# Patient Record
Sex: FEMALE | Race: WHITE | NOT HISPANIC OR LATINO | Employment: PART TIME | URBAN - METROPOLITAN AREA
[De-identification: names, ages, dates, MRNs, and addresses within clinical notes are randomized per-mention and may not be internally consistent; named-entity substitution may affect disease eponyms.]

---

## 2017-02-04 ENCOUNTER — OFFICE VISIT - HEALTHEAST (OUTPATIENT)
Dept: FAMILY MEDICINE | Facility: CLINIC | Age: 62
End: 2017-02-04

## 2017-02-04 DIAGNOSIS — S39.012A LOW BACK STRAIN: ICD-10-CM

## 2017-02-04 DIAGNOSIS — S30.1XXA CONTUSION OF FLANK: ICD-10-CM

## 2017-02-04 DIAGNOSIS — R10.9 FLANK PAIN: ICD-10-CM

## 2017-02-06 ENCOUNTER — AMBULATORY - HEALTHEAST (OUTPATIENT)
Dept: FAMILY MEDICINE | Facility: CLINIC | Age: 62
End: 2017-02-06

## 2017-02-06 DIAGNOSIS — N39.0 UTI (URINARY TRACT INFECTION): ICD-10-CM

## 2017-02-23 ENCOUNTER — OFFICE VISIT - HEALTHEAST (OUTPATIENT)
Dept: INTERNAL MEDICINE | Facility: CLINIC | Age: 62
End: 2017-02-23

## 2017-02-23 ENCOUNTER — COMMUNICATION - HEALTHEAST (OUTPATIENT)
Dept: INTERNAL MEDICINE | Facility: CLINIC | Age: 62
End: 2017-02-23

## 2017-02-23 DIAGNOSIS — R80.9 MICROALBUMINURIA: ICD-10-CM

## 2017-02-23 DIAGNOSIS — E11.9 DM2 (DIABETES MELLITUS, TYPE 2) (H): ICD-10-CM

## 2017-02-23 DIAGNOSIS — E78.5 HYPERLIPIDEMIA: ICD-10-CM

## 2017-02-23 DIAGNOSIS — Z87.440 HISTORY OF BLADDER INFECTIONS: ICD-10-CM

## 2017-02-23 LAB — HBA1C MFR BLD: 8 % (ref 3.5–6)

## 2017-02-25 ENCOUNTER — AMBULATORY - HEALTHEAST (OUTPATIENT)
Dept: INTERNAL MEDICINE | Facility: CLINIC | Age: 62
End: 2017-02-25

## 2017-02-25 DIAGNOSIS — E11.9 TYPE 2 DIABETES MELLITUS (H): ICD-10-CM

## 2017-03-07 ENCOUNTER — COMMUNICATION - HEALTHEAST (OUTPATIENT)
Dept: INTERNAL MEDICINE | Facility: CLINIC | Age: 62
End: 2017-03-07

## 2017-03-07 DIAGNOSIS — E11.9 DM2 (DIABETES MELLITUS, TYPE 2) (H): ICD-10-CM

## 2017-04-21 ENCOUNTER — RECORDS - HEALTHEAST (OUTPATIENT)
Dept: ADMINISTRATIVE | Facility: OTHER | Age: 62
End: 2017-04-21

## 2017-04-27 ENCOUNTER — OFFICE VISIT - HEALTHEAST (OUTPATIENT)
Dept: FAMILY MEDICINE | Facility: CLINIC | Age: 62
End: 2017-04-27

## 2017-04-27 DIAGNOSIS — H66.90 ACUTE OTITIS MEDIA: ICD-10-CM

## 2017-04-27 DIAGNOSIS — J01.90 ACUTE SINUSITIS: ICD-10-CM

## 2017-06-07 ENCOUNTER — OFFICE VISIT - HEALTHEAST (OUTPATIENT)
Dept: PODIATRY | Facility: CLINIC | Age: 62
End: 2017-06-07

## 2017-06-07 DIAGNOSIS — B07.0 VERRUCA PLANTARIS: ICD-10-CM

## 2017-07-20 ENCOUNTER — RECORDS - HEALTHEAST (OUTPATIENT)
Dept: ADMINISTRATIVE | Facility: OTHER | Age: 62
End: 2017-07-20

## 2017-08-01 ENCOUNTER — OFFICE VISIT - HEALTHEAST (OUTPATIENT)
Dept: INTERNAL MEDICINE | Facility: CLINIC | Age: 62
End: 2017-08-01

## 2017-08-01 DIAGNOSIS — B00.1 RECURRENT COLD SORES: ICD-10-CM

## 2017-08-01 DIAGNOSIS — Z12.31 SCREENING MAMMOGRAM, ENCOUNTER FOR: ICD-10-CM

## 2017-08-01 DIAGNOSIS — R80.9 MICROALBUMINURIA: ICD-10-CM

## 2017-08-01 DIAGNOSIS — E78.5 HLD (HYPERLIPIDEMIA): ICD-10-CM

## 2017-08-01 DIAGNOSIS — Z00.00 NORMAL ROUTINE PHYSICAL EXAMINATION: ICD-10-CM

## 2017-08-01 DIAGNOSIS — E11.9 DM2 (DIABETES MELLITUS, TYPE 2) (H): ICD-10-CM

## 2017-08-01 LAB
CHOLEST SERPL-MCNC: 162 MG/DL
FASTING STATUS PATIENT QL REPORTED: YES
HBA1C MFR BLD: 9.2 % (ref 3.5–6)
HDLC SERPL-MCNC: 50 MG/DL
LDLC SERPL CALC-MCNC: 86 MG/DL
TRIGL SERPL-MCNC: 128 MG/DL

## 2017-08-01 ASSESSMENT — MIFFLIN-ST. JEOR: SCORE: 1581.42

## 2017-08-04 LAB
HPV INTERPRETATION - HISTORICAL: NORMAL
HPV INTERPRETER - HISTORICAL: NORMAL

## 2017-08-06 ENCOUNTER — COMMUNICATION - HEALTHEAST (OUTPATIENT)
Dept: INTERNAL MEDICINE | Facility: CLINIC | Age: 62
End: 2017-08-06

## 2017-08-06 DIAGNOSIS — E78.5 HYPERLIPIDEMIA: ICD-10-CM

## 2017-08-07 LAB
BKR LAB AP ABNORMAL BLEEDING: NO
BKR LAB AP BIRTH CONTROL/HORMONES: NORMAL
BKR LAB AP CERVICAL APPEARANCE: NORMAL
BKR LAB AP GYN ADEQUACY: NORMAL
BKR LAB AP GYN INTERPRETATION: NORMAL
BKR LAB AP HPV REFLEX: NORMAL
BKR LAB AP LMP: NORMAL
BKR LAB AP PATIENT STATUS: NORMAL
BKR LAB AP PREVIOUS ABNORMAL: NORMAL
BKR LAB AP PREVIOUS NORMAL: 2015
HIGH RISK?: NO
PATH REPORT.COMMENTS IMP SPEC: NORMAL
RESULT FLAG (HE HISTORICAL CONVERSION): NORMAL

## 2017-08-10 ENCOUNTER — HOSPITAL ENCOUNTER (OUTPATIENT)
Dept: MAMMOGRAPHY | Facility: HOSPITAL | Age: 62
Discharge: HOME | End: 2017-08-10
Attending: INTERNAL MEDICINE

## 2017-08-10 DIAGNOSIS — Z12.31 SCREENING MAMMOGRAM, ENCOUNTER FOR: ICD-10-CM

## 2017-08-17 ENCOUNTER — AMBULATORY - HEALTHEAST (OUTPATIENT)
Dept: EDUCATION SERVICES | Facility: CLINIC | Age: 62
End: 2017-08-17

## 2017-08-17 DIAGNOSIS — E11.9 TYPE 2 DIABETES MELLITUS WITHOUT COMPLICATION, WITHOUT LONG-TERM CURRENT USE OF INSULIN (H): ICD-10-CM

## 2017-09-04 ENCOUNTER — COMMUNICATION - HEALTHEAST (OUTPATIENT)
Dept: INTERNAL MEDICINE | Facility: CLINIC | Age: 62
End: 2017-09-04

## 2017-09-04 DIAGNOSIS — E11.9 TYPE 2 DIABETES MELLITUS WITHOUT COMPLICATION, WITHOUT LONG-TERM CURRENT USE OF INSULIN (H): ICD-10-CM

## 2017-09-24 ENCOUNTER — COMMUNICATION - HEALTHEAST (OUTPATIENT)
Dept: INTERNAL MEDICINE | Facility: CLINIC | Age: 62
End: 2017-09-24

## 2017-09-24 DIAGNOSIS — R80.9 MICROALBUMINURIA: ICD-10-CM

## 2017-09-27 ENCOUNTER — AMBULATORY - HEALTHEAST (OUTPATIENT)
Dept: EDUCATION SERVICES | Facility: CLINIC | Age: 62
End: 2017-09-27

## 2017-09-27 DIAGNOSIS — E11.9 TYPE 2 DIABETES MELLITUS WITHOUT COMPLICATION, WITHOUT LONG-TERM CURRENT USE OF INSULIN (H): ICD-10-CM

## 2017-09-28 ENCOUNTER — RECORDS - HEALTHEAST (OUTPATIENT)
Dept: ADMINISTRATIVE | Facility: OTHER | Age: 62
End: 2017-09-28

## 2017-10-10 ENCOUNTER — COMMUNICATION - HEALTHEAST (OUTPATIENT)
Dept: ADMINISTRATIVE | Facility: CLINIC | Age: 62
End: 2017-10-10

## 2017-11-01 ENCOUNTER — OFFICE VISIT - HEALTHEAST (OUTPATIENT)
Dept: INTERNAL MEDICINE | Facility: CLINIC | Age: 62
End: 2017-11-01

## 2017-11-01 ENCOUNTER — AMBULATORY - HEALTHEAST (OUTPATIENT)
Dept: EDUCATION SERVICES | Facility: CLINIC | Age: 62
End: 2017-11-01

## 2017-11-01 DIAGNOSIS — R74.8 ELEVATED LIVER ENZYMES: ICD-10-CM

## 2017-11-01 DIAGNOSIS — E11.9 TYPE 2 DIABETES MELLITUS WITHOUT COMPLICATION, WITHOUT LONG-TERM CURRENT USE OF INSULIN (H): ICD-10-CM

## 2017-11-01 DIAGNOSIS — E78.5 HYPERLIPIDEMIA: ICD-10-CM

## 2017-11-01 LAB — HBA1C MFR BLD: 7.1 % (ref 3.5–6)

## 2017-11-21 ENCOUNTER — COMMUNICATION - HEALTHEAST (OUTPATIENT)
Dept: ENDOCRINOLOGY | Facility: CLINIC | Age: 62
End: 2017-11-21

## 2017-11-21 DIAGNOSIS — E11.9 TYPE 2 DIABETES MELLITUS WITHOUT COMPLICATION, WITHOUT LONG-TERM CURRENT USE OF INSULIN (H): ICD-10-CM

## 2017-12-23 ENCOUNTER — COMMUNICATION - HEALTHEAST (OUTPATIENT)
Dept: ENDOCRINOLOGY | Facility: CLINIC | Age: 62
End: 2017-12-23

## 2017-12-23 DIAGNOSIS — E11.9 TYPE 2 DIABETES MELLITUS WITHOUT COMPLICATION, WITHOUT LONG-TERM CURRENT USE OF INSULIN (H): ICD-10-CM

## 2018-01-08 ENCOUNTER — OFFICE VISIT - HEALTHEAST (OUTPATIENT)
Dept: FAMILY MEDICINE | Facility: CLINIC | Age: 63
End: 2018-01-08

## 2018-01-08 DIAGNOSIS — R10.9 FLANK PAIN: ICD-10-CM

## 2018-01-08 DIAGNOSIS — N39.0 UTI (URINARY TRACT INFECTION): ICD-10-CM

## 2018-01-08 DIAGNOSIS — R30.0 DYSURIA: ICD-10-CM

## 2018-01-08 LAB
ALBUMIN UR-MCNC: NEGATIVE MG/DL
APPEARANCE UR: CLEAR
BILIRUB UR QL STRIP: NEGATIVE
COLOR UR AUTO: YELLOW
GLUCOSE UR STRIP-MCNC: ABNORMAL MG/DL
HGB UR QL STRIP: NEGATIVE
KETONES UR STRIP-MCNC: NEGATIVE MG/DL
LEUKOCYTE ESTERASE UR QL STRIP: NEGATIVE
NITRATE UR QL: NEGATIVE
PH UR STRIP: 5.5 [PH] (ref 5–8)
SP GR UR STRIP: 1.02 (ref 1–1.03)
UROBILINOGEN UR STRIP-ACNC: ABNORMAL

## 2018-01-09 ENCOUNTER — COMMUNICATION - HEALTHEAST (OUTPATIENT)
Dept: INTERNAL MEDICINE | Facility: CLINIC | Age: 63
End: 2018-01-09

## 2018-01-10 LAB — BACTERIA SPEC CULT: ABNORMAL

## 2018-04-11 ENCOUNTER — COMMUNICATION - HEALTHEAST (OUTPATIENT)
Dept: INTERNAL MEDICINE | Facility: CLINIC | Age: 63
End: 2018-04-11

## 2018-04-11 DIAGNOSIS — E11.9 TYPE 2 DIABETES MELLITUS WITHOUT COMPLICATION, WITHOUT LONG-TERM CURRENT USE OF INSULIN (H): ICD-10-CM

## 2018-04-22 ENCOUNTER — COMMUNICATION - HEALTHEAST (OUTPATIENT)
Dept: INTERNAL MEDICINE | Facility: CLINIC | Age: 63
End: 2018-04-22

## 2018-04-22 DIAGNOSIS — E11.9 TYPE 2 DIABETES MELLITUS WITHOUT COMPLICATION, WITHOUT LONG-TERM CURRENT USE OF INSULIN (H): ICD-10-CM

## 2018-05-03 ENCOUNTER — OFFICE VISIT - HEALTHEAST (OUTPATIENT)
Dept: INTERNAL MEDICINE | Facility: CLINIC | Age: 63
End: 2018-05-03

## 2018-05-03 ENCOUNTER — COMMUNICATION - HEALTHEAST (OUTPATIENT)
Dept: INTERNAL MEDICINE | Facility: CLINIC | Age: 63
End: 2018-05-03

## 2018-05-03 DIAGNOSIS — E11.9 TYPE 2 DIABETES MELLITUS WITHOUT COMPLICATION, WITHOUT LONG-TERM CURRENT USE OF INSULIN (H): ICD-10-CM

## 2018-05-03 DIAGNOSIS — E78.5 HLD (HYPERLIPIDEMIA): ICD-10-CM

## 2018-05-03 LAB
ALBUMIN SERPL-MCNC: 4 G/DL (ref 3.5–5)
ALP SERPL-CCNC: 116 U/L (ref 45–120)
ALT SERPL W P-5'-P-CCNC: 49 U/L (ref 0–45)
ANION GAP SERPL CALCULATED.3IONS-SCNC: 10 MMOL/L (ref 5–18)
AST SERPL W P-5'-P-CCNC: 37 U/L (ref 0–40)
BILIRUB SERPL-MCNC: 1.8 MG/DL (ref 0–1)
BUN SERPL-MCNC: 21 MG/DL (ref 8–22)
CALCIUM SERPL-MCNC: 10 MG/DL (ref 8.5–10.5)
CHLORIDE BLD-SCNC: 106 MMOL/L (ref 98–107)
CO2 SERPL-SCNC: 24 MMOL/L (ref 22–31)
CREAT SERPL-MCNC: 0.68 MG/DL (ref 0.6–1.1)
GFR SERPL CREATININE-BSD FRML MDRD: >60 ML/MIN/1.73M2
GLUCOSE BLD-MCNC: 85 MG/DL (ref 70–125)
HBA1C MFR BLD: 8 % (ref 3.5–6)
POTASSIUM BLD-SCNC: 4.3 MMOL/L (ref 3.5–5)
PROT SERPL-MCNC: 7.3 G/DL (ref 6–8)
SODIUM SERPL-SCNC: 140 MMOL/L (ref 136–145)

## 2018-05-17 ENCOUNTER — RECORDS - HEALTHEAST (OUTPATIENT)
Dept: ADMINISTRATIVE | Facility: OTHER | Age: 63
End: 2018-05-17

## 2018-05-17 ENCOUNTER — RECORDS - HEALTHEAST (OUTPATIENT)
Dept: GENERAL RADIOLOGY | Facility: CLINIC | Age: 63
End: 2018-05-17

## 2018-05-17 ENCOUNTER — OFFICE VISIT - HEALTHEAST (OUTPATIENT)
Dept: FAMILY MEDICINE | Facility: CLINIC | Age: 63
End: 2018-05-17

## 2018-05-17 DIAGNOSIS — S99.912A INJURY OF LEFT ANKLE, INITIAL ENCOUNTER: ICD-10-CM
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
## 2018-05-17 DIAGNOSIS — S99.912A UNSPECIFIED INJURY OF LEFT ANKLE, INITIAL ENCOUNTER: ICD-10-CM

## 2018-05-18 ENCOUNTER — RECORDS - HEALTHEAST (OUTPATIENT)
Dept: ADMINISTRATIVE | Facility: OTHER | Age: 63
End: 2018-05-18

## 2018-06-01 ENCOUNTER — RECORDS - HEALTHEAST (OUTPATIENT)
Dept: ADMINISTRATIVE | Facility: OTHER | Age: 63
End: 2018-06-01

## 2018-07-16 ENCOUNTER — RECORDS - HEALTHEAST (OUTPATIENT)
Dept: ADMINISTRATIVE | Facility: OTHER | Age: 63
End: 2018-07-16

## 2018-07-17 ENCOUNTER — RECORDS - HEALTHEAST (OUTPATIENT)
Dept: ADMINISTRATIVE | Facility: OTHER | Age: 63
End: 2018-07-17

## 2018-09-25 ENCOUNTER — COMMUNICATION - HEALTHEAST (OUTPATIENT)
Dept: INTERNAL MEDICINE | Facility: CLINIC | Age: 63
End: 2018-09-25

## 2018-09-25 DIAGNOSIS — R80.9 MICROALBUMINURIA: ICD-10-CM

## 2018-11-19 ENCOUNTER — COMMUNICATION - HEALTHEAST (OUTPATIENT)
Dept: INTERNAL MEDICINE | Facility: CLINIC | Age: 63
End: 2018-11-19

## 2018-11-19 DIAGNOSIS — E11.9 TYPE 2 DIABETES MELLITUS WITHOUT COMPLICATION, WITHOUT LONG-TERM CURRENT USE OF INSULIN (H): ICD-10-CM

## 2018-12-26 ENCOUNTER — COMMUNICATION - HEALTHEAST (OUTPATIENT)
Dept: INTERNAL MEDICINE | Facility: CLINIC | Age: 63
End: 2018-12-26

## 2018-12-31 ENCOUNTER — OFFICE VISIT - HEALTHEAST (OUTPATIENT)
Dept: INTERNAL MEDICINE | Facility: CLINIC | Age: 63
End: 2018-12-31

## 2018-12-31 DIAGNOSIS — R19.7 DIARRHEA, UNSPECIFIED TYPE: ICD-10-CM

## 2018-12-31 DIAGNOSIS — N83.202 CYST OF LEFT OVARY: ICD-10-CM

## 2019-01-02 ENCOUNTER — AMBULATORY - HEALTHEAST (OUTPATIENT)
Dept: LAB | Facility: CLINIC | Age: 64
End: 2019-01-02

## 2019-01-02 DIAGNOSIS — R19.7 DIARRHEA, UNSPECIFIED TYPE: ICD-10-CM

## 2019-01-02 LAB
C DIFF TOX B STL QL: NEGATIVE
RIBOTYPE 027/NAP1/BI: NORMAL

## 2019-01-08 ENCOUNTER — OFFICE VISIT - HEALTHEAST (OUTPATIENT)
Dept: INTERNAL MEDICINE | Facility: CLINIC | Age: 64
End: 2019-01-08

## 2019-01-08 DIAGNOSIS — R19.5 LOOSE STOOLS: ICD-10-CM

## 2019-01-08 DIAGNOSIS — E11.9 TYPE 2 DIABETES MELLITUS WITHOUT COMPLICATION, WITHOUT LONG-TERM CURRENT USE OF INSULIN (H): ICD-10-CM

## 2019-01-16 ENCOUNTER — RECORDS - HEALTHEAST (OUTPATIENT)
Dept: ADMINISTRATIVE | Facility: OTHER | Age: 64
End: 2019-01-16

## 2019-01-20 ENCOUNTER — RECORDS - HEALTHEAST (OUTPATIENT)
Dept: ADMINISTRATIVE | Facility: OTHER | Age: 64
End: 2019-01-20

## 2019-01-21 ENCOUNTER — OFFICE VISIT - HEALTHEAST (OUTPATIENT)
Dept: PHARMACY | Facility: CLINIC | Age: 64
End: 2019-01-21

## 2019-01-21 DIAGNOSIS — R80.9 MICROALBUMINURIA: ICD-10-CM

## 2019-01-21 DIAGNOSIS — E78.5 HYPERLIPIDEMIA, UNSPECIFIED HYPERLIPIDEMIA TYPE: ICD-10-CM

## 2019-01-21 DIAGNOSIS — E11.9 TYPE 2 DIABETES MELLITUS WITHOUT COMPLICATION, WITHOUT LONG-TERM CURRENT USE OF INSULIN (H): ICD-10-CM

## 2019-01-21 DIAGNOSIS — E78.5 HYPERLIPIDEMIA: ICD-10-CM

## 2019-01-30 ENCOUNTER — COMMUNICATION - HEALTHEAST (OUTPATIENT)
Dept: PHARMACY | Facility: CLINIC | Age: 64
End: 2019-01-30

## 2019-02-11 ENCOUNTER — COMMUNICATION - HEALTHEAST (OUTPATIENT)
Dept: INTERNAL MEDICINE | Facility: CLINIC | Age: 64
End: 2019-02-11

## 2019-02-11 DIAGNOSIS — E11.9 TYPE 2 DIABETES MELLITUS WITHOUT COMPLICATION, WITHOUT LONG-TERM CURRENT USE OF INSULIN (H): ICD-10-CM

## 2019-02-20 ENCOUNTER — RECORDS - HEALTHEAST (OUTPATIENT)
Dept: ADMINISTRATIVE | Facility: OTHER | Age: 64
End: 2019-02-20

## 2019-03-01 ENCOUNTER — OFFICE VISIT - HEALTHEAST (OUTPATIENT)
Dept: PHARMACY | Facility: CLINIC | Age: 64
End: 2019-03-01

## 2019-03-01 DIAGNOSIS — R80.9 MICROALBUMINURIA: ICD-10-CM

## 2019-03-01 DIAGNOSIS — E78.5 HYPERLIPIDEMIA, UNSPECIFIED HYPERLIPIDEMIA TYPE: ICD-10-CM

## 2019-03-01 DIAGNOSIS — E11.9 TYPE 2 DIABETES MELLITUS WITHOUT COMPLICATION, WITHOUT LONG-TERM CURRENT USE OF INSULIN (H): ICD-10-CM

## 2019-03-05 ENCOUNTER — COMMUNICATION - HEALTHEAST (OUTPATIENT)
Dept: INTERNAL MEDICINE | Facility: CLINIC | Age: 64
End: 2019-03-05

## 2019-03-05 DIAGNOSIS — E11.9 DM2 (DIABETES MELLITUS, TYPE 2) (H): ICD-10-CM

## 2019-03-15 ENCOUNTER — COMMUNICATION - HEALTHEAST (OUTPATIENT)
Dept: PHARMACY | Facility: CLINIC | Age: 64
End: 2019-03-15

## 2019-03-15 DIAGNOSIS — E11.9 TYPE 2 DIABETES MELLITUS WITHOUT COMPLICATION, WITHOUT LONG-TERM CURRENT USE OF INSULIN (H): ICD-10-CM

## 2019-04-01 ENCOUNTER — RECORDS - HEALTHEAST (OUTPATIENT)
Dept: ADMINISTRATIVE | Facility: OTHER | Age: 64
End: 2019-04-01

## 2019-04-29 ENCOUNTER — OFFICE VISIT - HEALTHEAST (OUTPATIENT)
Dept: PHARMACY | Facility: CLINIC | Age: 64
End: 2019-04-29

## 2019-04-29 ENCOUNTER — AMBULATORY - HEALTHEAST (OUTPATIENT)
Dept: LAB | Facility: CLINIC | Age: 64
End: 2019-04-29

## 2019-04-29 DIAGNOSIS — E11.9 TYPE 2 DIABETES MELLITUS WITHOUT COMPLICATION, WITHOUT LONG-TERM CURRENT USE OF INSULIN (H): ICD-10-CM

## 2019-04-29 DIAGNOSIS — E78.5 HYPERLIPIDEMIA, UNSPECIFIED HYPERLIPIDEMIA TYPE: ICD-10-CM

## 2019-04-29 DIAGNOSIS — R80.9 MICROALBUMINURIA: ICD-10-CM

## 2019-04-29 LAB
ALBUMIN SERPL-MCNC: 3.9 G/DL (ref 3.5–5)
ALP SERPL-CCNC: 97 U/L (ref 45–120)
ALT SERPL W P-5'-P-CCNC: 96 U/L (ref 0–45)
ANION GAP SERPL CALCULATED.3IONS-SCNC: 10 MMOL/L (ref 5–18)
AST SERPL W P-5'-P-CCNC: 67 U/L (ref 0–40)
BILIRUB SERPL-MCNC: 1 MG/DL (ref 0–1)
BUN SERPL-MCNC: 16 MG/DL (ref 8–22)
CALCIUM SERPL-MCNC: 9.9 MG/DL (ref 8.5–10.5)
CHLORIDE BLD-SCNC: 103 MMOL/L (ref 98–107)
CO2 SERPL-SCNC: 26 MMOL/L (ref 22–31)
CREAT SERPL-MCNC: 0.72 MG/DL (ref 0.6–1.1)
CREAT UR-MCNC: 136 MG/DL
GFR SERPL CREATININE-BSD FRML MDRD: >60 ML/MIN/1.73M2
GLUCOSE BLD-MCNC: 91 MG/DL (ref 70–125)
HBA1C MFR BLD: 9 % (ref 3.5–6)
LDLC SERPL CALC-MCNC: 109 MG/DL
MICROALBUMIN UR-MCNC: 13.69 MG/DL (ref 0–1.99)
MICROALBUMIN/CREAT UR: 100.7 MG/G
POTASSIUM BLD-SCNC: 4.1 MMOL/L (ref 3.5–5)
PROT SERPL-MCNC: 7.2 G/DL (ref 6–8)
SODIUM SERPL-SCNC: 139 MMOL/L (ref 136–145)

## 2019-05-27 ENCOUNTER — COMMUNICATION - HEALTHEAST (OUTPATIENT)
Dept: NURSING | Facility: CLINIC | Age: 64
End: 2019-05-27

## 2019-05-28 ENCOUNTER — RECORDS - HEALTHEAST (OUTPATIENT)
Dept: ADMINISTRATIVE | Facility: OTHER | Age: 64
End: 2019-05-28

## 2019-05-29 ENCOUNTER — RECORDS - HEALTHEAST (OUTPATIENT)
Dept: ADMINISTRATIVE | Facility: OTHER | Age: 64
End: 2019-05-29

## 2019-06-12 ENCOUNTER — OFFICE VISIT - HEALTHEAST (OUTPATIENT)
Dept: PHARMACY | Facility: CLINIC | Age: 64
End: 2019-06-12

## 2019-06-12 DIAGNOSIS — E11.9 TYPE 2 DIABETES MELLITUS WITHOUT COMPLICATION, WITHOUT LONG-TERM CURRENT USE OF INSULIN (H): ICD-10-CM

## 2019-06-12 DIAGNOSIS — E78.5 HYPERLIPIDEMIA, UNSPECIFIED HYPERLIPIDEMIA TYPE: ICD-10-CM

## 2019-06-12 DIAGNOSIS — R80.9 MICROALBUMINURIA: ICD-10-CM

## 2019-06-17 ENCOUNTER — COMMUNICATION - HEALTHEAST (OUTPATIENT)
Dept: FAMILY MEDICINE | Facility: CLINIC | Age: 64
End: 2019-06-17

## 2019-06-28 ENCOUNTER — COMMUNICATION - HEALTHEAST (OUTPATIENT)
Dept: NURSING | Facility: CLINIC | Age: 64
End: 2019-06-28

## 2019-07-24 ENCOUNTER — OFFICE VISIT - HEALTHEAST (OUTPATIENT)
Dept: PHARMACY | Facility: CLINIC | Age: 64
End: 2019-07-24

## 2019-07-24 DIAGNOSIS — E78.5 HYPERLIPIDEMIA, UNSPECIFIED HYPERLIPIDEMIA TYPE: ICD-10-CM

## 2019-07-24 DIAGNOSIS — E11.9 TYPE 2 DIABETES MELLITUS WITHOUT COMPLICATION, WITHOUT LONG-TERM CURRENT USE OF INSULIN (H): ICD-10-CM

## 2019-07-24 DIAGNOSIS — R80.9 MICROALBUMINURIA: ICD-10-CM

## 2019-07-26 ENCOUNTER — COMMUNICATION - HEALTHEAST (OUTPATIENT)
Dept: INTERNAL MEDICINE | Facility: CLINIC | Age: 64
End: 2019-07-26

## 2019-07-26 DIAGNOSIS — E11.9 DM2 (DIABETES MELLITUS, TYPE 2) (H): ICD-10-CM

## 2019-10-24 ENCOUNTER — COMMUNICATION - HEALTHEAST (OUTPATIENT)
Dept: INTERNAL MEDICINE | Facility: CLINIC | Age: 64
End: 2019-10-24

## 2019-10-24 ENCOUNTER — OFFICE VISIT - HEALTHEAST (OUTPATIENT)
Dept: INTERNAL MEDICINE | Facility: CLINIC | Age: 64
End: 2019-10-24

## 2019-10-24 DIAGNOSIS — Z51.81 ENCOUNTER FOR THERAPEUTIC DRUG MONITORING: ICD-10-CM

## 2019-10-24 DIAGNOSIS — Z87.19 HISTORY OF FATTY INFILTRATION OF LIVER: ICD-10-CM

## 2019-10-24 DIAGNOSIS — E11.21 MICROALBUMINURIC DIABETIC NEPHROPATHY (H): ICD-10-CM

## 2019-10-24 DIAGNOSIS — Z86.0100 HISTORY OF COLONIC POLYPS: ICD-10-CM

## 2019-10-24 DIAGNOSIS — Z82.49 FAMILY HISTORY OF CORONARY ARTERY DISEASE IN FATHER: ICD-10-CM

## 2019-10-24 DIAGNOSIS — E78.5 HYPERLIPIDEMIA: ICD-10-CM

## 2019-10-24 DIAGNOSIS — Z12.31 VISIT FOR SCREENING MAMMOGRAM: ICD-10-CM

## 2019-10-24 DIAGNOSIS — E11.9 TYPE 2 DIABETES MELLITUS WITHOUT COMPLICATION, WITHOUT LONG-TERM CURRENT USE OF INSULIN (H): ICD-10-CM

## 2019-10-24 DIAGNOSIS — E55.9 VITAMIN D DEFICIENCY: ICD-10-CM

## 2019-10-24 DIAGNOSIS — N83.201 RIGHT OVARIAN CYST: ICD-10-CM

## 2019-10-24 LAB
ALBUMIN SERPL-MCNC: 4.1 G/DL (ref 3.5–5)
ALP SERPL-CCNC: 97 U/L (ref 45–120)
ALT SERPL W P-5'-P-CCNC: 33 U/L (ref 0–45)
ANION GAP SERPL CALCULATED.3IONS-SCNC: 11 MMOL/L (ref 5–18)
AST SERPL W P-5'-P-CCNC: 27 U/L (ref 0–40)
BILIRUB SERPL-MCNC: 2.2 MG/DL (ref 0–1)
BUN SERPL-MCNC: 23 MG/DL (ref 8–22)
CALCIUM SERPL-MCNC: 9.7 MG/DL (ref 8.5–10.5)
CHLORIDE BLD-SCNC: 102 MMOL/L (ref 98–107)
CO2 SERPL-SCNC: 23 MMOL/L (ref 22–31)
CREAT SERPL-MCNC: 0.77 MG/DL (ref 0.6–1.1)
GFR SERPL CREATININE-BSD FRML MDRD: >60 ML/MIN/1.73M2
GLUCOSE BLD-MCNC: 110 MG/DL (ref 70–125)
HBA1C MFR BLD: 7.2 % (ref 3.5–6)
POTASSIUM BLD-SCNC: 4.3 MMOL/L (ref 3.5–5)
PROT SERPL-MCNC: 6.8 G/DL (ref 6–8)
SODIUM SERPL-SCNC: 136 MMOL/L (ref 136–145)

## 2019-10-25 ENCOUNTER — COMMUNICATION - HEALTHEAST (OUTPATIENT)
Dept: INTERNAL MEDICINE | Facility: CLINIC | Age: 64
End: 2019-10-25

## 2019-10-28 ENCOUNTER — COMMUNICATION - HEALTHEAST (OUTPATIENT)
Dept: NURSING | Facility: CLINIC | Age: 64
End: 2019-10-28

## 2019-12-12 ENCOUNTER — HOSPITAL ENCOUNTER (OUTPATIENT)
Dept: MAMMOGRAPHY | Facility: CLINIC | Age: 64
Discharge: HOME | End: 2019-12-12
Attending: INTERNAL MEDICINE

## 2019-12-12 DIAGNOSIS — Z12.31 VISIT FOR SCREENING MAMMOGRAM: ICD-10-CM

## 2019-12-18 ENCOUNTER — RECORDS - HEALTHEAST (OUTPATIENT)
Dept: ADMINISTRATIVE | Facility: OTHER | Age: 64
End: 2019-12-18

## 2019-12-31 ENCOUNTER — COMMUNICATION - HEALTHEAST (OUTPATIENT)
Dept: INTERNAL MEDICINE | Facility: CLINIC | Age: 64
End: 2019-12-31

## 2020-01-20 ENCOUNTER — OFFICE VISIT - HEALTHEAST (OUTPATIENT)
Dept: INTERNAL MEDICINE | Facility: CLINIC | Age: 65
End: 2020-01-20

## 2020-01-20 DIAGNOSIS — E11.9 TYPE 2 DIABETES MELLITUS WITHOUT COMPLICATION, WITHOUT LONG-TERM CURRENT USE OF INSULIN (H): ICD-10-CM

## 2020-01-20 DIAGNOSIS — Z87.19 HISTORY OF FATTY INFILTRATION OF LIVER: ICD-10-CM

## 2020-01-20 DIAGNOSIS — Z86.0100 HISTORY OF COLONIC POLYPS: ICD-10-CM

## 2020-01-20 DIAGNOSIS — R80.9 MICROALBUMINURIA: ICD-10-CM

## 2020-01-20 DIAGNOSIS — E78.00 HYPERCHOLESTEROLEMIA: ICD-10-CM

## 2020-01-20 DIAGNOSIS — Z51.81 ENCOUNTER FOR THERAPEUTIC DRUG MONITORING: ICD-10-CM

## 2020-01-20 DIAGNOSIS — N83.202 CYST OF LEFT OVARY: ICD-10-CM

## 2020-01-20 LAB
ALBUMIN SERPL-MCNC: 3.7 G/DL (ref 3.5–5)
ALP SERPL-CCNC: 113 U/L (ref 45–120)
ALT SERPL W P-5'-P-CCNC: 30 U/L (ref 0–45)
ANION GAP SERPL CALCULATED.3IONS-SCNC: 13 MMOL/L (ref 5–18)
AST SERPL W P-5'-P-CCNC: 23 U/L (ref 0–40)
BILIRUB SERPL-MCNC: 1 MG/DL (ref 0–1)
BUN SERPL-MCNC: 14 MG/DL (ref 8–22)
CALCIUM SERPL-MCNC: 9.2 MG/DL (ref 8.5–10.5)
CHLORIDE BLD-SCNC: 105 MMOL/L (ref 98–107)
CHOLEST SERPL-MCNC: 178 MG/DL
CO2 SERPL-SCNC: 22 MMOL/L (ref 22–31)
CREAT SERPL-MCNC: 0.65 MG/DL (ref 0.6–1.1)
FASTING STATUS PATIENT QL REPORTED: YES
GFR SERPL CREATININE-BSD FRML MDRD: >60 ML/MIN/1.73M2
GLUCOSE BLD-MCNC: 132 MG/DL (ref 70–125)
HBA1C MFR BLD: 6.8 % (ref 3.5–6)
HDLC SERPL-MCNC: 52 MG/DL
LDLC SERPL CALC-MCNC: 95 MG/DL
POTASSIUM BLD-SCNC: 4.4 MMOL/L (ref 3.5–5)
PROT SERPL-MCNC: 6.7 G/DL (ref 6–8)
SODIUM SERPL-SCNC: 140 MMOL/L (ref 136–145)
TRIGL SERPL-MCNC: 156 MG/DL

## 2020-01-21 ENCOUNTER — COMMUNICATION - HEALTHEAST (OUTPATIENT)
Dept: INTERNAL MEDICINE | Facility: CLINIC | Age: 65
End: 2020-01-21

## 2020-02-24 ENCOUNTER — COMMUNICATION - HEALTHEAST (OUTPATIENT)
Dept: NURSING | Facility: CLINIC | Age: 65
End: 2020-02-24

## 2020-04-16 ENCOUNTER — COMMUNICATION - HEALTHEAST (OUTPATIENT)
Dept: INTERNAL MEDICINE | Facility: CLINIC | Age: 65
End: 2020-04-16

## 2020-04-16 DIAGNOSIS — E11.9 TYPE 2 DIABETES MELLITUS WITHOUT COMPLICATION, WITHOUT LONG-TERM CURRENT USE OF INSULIN (H): ICD-10-CM

## 2020-06-01 ENCOUNTER — COMMUNICATION - HEALTHEAST (OUTPATIENT)
Dept: INTERNAL MEDICINE | Facility: CLINIC | Age: 65
End: 2020-06-01

## 2020-06-30 ENCOUNTER — AMBULATORY - HEALTHEAST (OUTPATIENT)
Dept: FAMILY MEDICINE | Facility: CLINIC | Age: 65
End: 2020-06-30

## 2020-06-30 ENCOUNTER — VIRTUAL VISIT (OUTPATIENT)
Dept: FAMILY MEDICINE | Facility: OTHER | Age: 65
End: 2020-06-30

## 2020-06-30 ENCOUNTER — NURSE TRIAGE (OUTPATIENT)
Dept: NURSING | Facility: CLINIC | Age: 65
End: 2020-06-30

## 2020-06-30 DIAGNOSIS — Z20.822 SUSPECTED COVID-19 VIRUS INFECTION: ICD-10-CM

## 2020-06-30 NOTE — TELEPHONE ENCOUNTER
Patient calling. 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    Her son-in-law was tested on Friday and found out late yesterday that he is covid positive.    She is requesting to be tested.    Referred on [facility] for test orders.    Protocol and care advice reviewed  Caller states understanding of the recommended disposition    Advised to call back if further questions or concerns      Additional Information    Negative: [1] Caller is not with the adult (patient) AND [2] reporting urgent symptoms    Negative: Lab result questions    Negative: Medication questions    Negative: Caller can't be reached by phone    Negative: Caller has already spoken to PCP or another triager    Negative: RN needs further essential information from caller in order to complete triage    Negative: Requesting regular office appointment    Negative: [1] Caller requesting NON-URGENT health information AND [2] PCP's office is the best resource    General information question, no triage required and triager able to answer question    Negative: Health Information question, no triage required and triager able to answer question    Protocols used: INFORMATION ONLY CALL-A-

## 2020-06-30 NOTE — PROGRESS NOTES
"Date: 2020 13:28:47  Clinician: Jc Talbot  Clinician NPI: [id number]  Patient: Helen Chavez  Patient : 1955  Patient Address: [address] [place] [id number]  Patient Phone: [phone]  Visit Protocol: URI  Patient Summary:  Helen is a 65 year old ( : 1955 ) female who initiated a Visit for COVID-19 (Coronavirus) evaluation and screening. When asked the question \"Please sign me up to receive news, health information and promotions from [facility].\", Helen responded \"No\".    When asked when her symptoms started, Helen reported that she does not have any symptoms.   She denies having recent facial or sinus surgery in the past 60 days and taking antibiotic medication in the past month.    Pertinent COVID-19 (Coronavirus) information  In the past 14 days, Helen has not worked in a congregate living setting.   She does not work or volunteer as healthcare worker or a  and does not work or volunteer in a healthcare facility.   Helen also has not lived in a congregate living setting in the past 14 days. She does not live with a healthcare worker.   Helen has had a close contact with a laboratory-confirmed COVID-19 patient in the last 14 days. Additional information about contact with COVID-19 (Coronavirus) patient as reported by the patient (free text): My son-n-law tested positive yesterday, . I was at his home last week 3 times &amp; was with him on Father's Day for at least 6 hours. My  has had cancer &amp; his white platelet level is 24, which is extremely low.   Pertinent medical history  Helen typically gets a yeast infection when she takes antibiotics. She is not sure if she has used fluconazole (Diflucan) to treat previous yeast infections.   Helen does not need a return to work/school note.   Weight: 191 lbs   Helen does not smoke or use smokeless tobacco.   Additional information as reported by the patient (free text): I " have Diabetes.   Weight: 191 lbs  Reason for repeat visit for the same protocol within 24 hours:  The [facility] representative told me to create a new visit when I went to the page that said list medications, it froze &amp; I was unable to go any further.  I want a COVID-19 test. I was exposed to a relative that tested positive on Monday, June 29th. I am 65 years old &amp; I am a diabetic.  See the History of referred by protocol and completed visits section for additional details on the previous visit.    MEDICATIONS: Ozempic subcutaneous, lisinopril oral, rosuvastatin oral, metformin oral, ALLERGIES: Penicillins  Clinician Response:  Dear Helen,   Based on your exposure to COVID-19 (Coronavirus), we would like to test you for this virus.  1. Please call [phone] to schedule your visit. Explain that [facility]. Be ready to share your [facility] visit ID number.  The following will serve as your written order for this COVID Test, ordered by me, for the indication of suspected COVID [Z20.828]: The test will be ordered in [facility], our electronic health record, after you are scheduled. It will show as ordered and authorized by Rich Hollins MD.  Order: COVID-19 (Coronavirus) PCR for ASYMPTOMATIC EXPOSURE testing from [facility].      2. When it's time for your COVID test:  Stay at least 6 feet away from others. (If someone will drive you to your test, stay in the backseat, as far away from the  as you can.)   Cover your mouth and nose with a mask, tissue or washcloth.  Go straight to the testing site. Don't make any stops on the way there or back.  Please note  Caregivers in these groups are at risk for severe illness due to COVID-19:  o People 65 years and older  o People who live in a nursing home or long-term care facility  o People with chronic disease (lung, heart, cancer, diabetes, kidney, liver, immunologic)  o People who have a weakened immune system, including those who:     Are in cancer treatment   Take medicine that weakens the immune system, such as corticosteroids   Had a bone marrow or organ transplant   Have an immune deficiency   Have poorly controlled HIV or AIDS   Are obese (body mass index of 40 or higher)   Smoke regularly   Where can I get more information?     [facility] -- About COVID-19: [email or web address]   CDC -- What to Do If You're Sick: www.cdc.gov/coronavirus/2019-ncov/about/steps-when-sick.html   CDC -- Ending Home Isolation: www.cdc.gov/coronavirus/2019-ncov/hcp/disposition-in-home-patients.html   CDC -- Caring for Someone: www.cdc.gov/coronavirus/2019-ncov/if-you-are-sick/care-for-someone.html   [facility] -- Interim Guidance for Hospital Discharge to Home: www.[facility].mn.us/diseases/coronavirus/hcp/hospdischarge.pdf   [facility] clinical trials (COVID-19 research studies): clinicalaffairs.[employer].[facility]/[employer]-clinical-trials   Below are the COVID-19 hotlines at the [place] [facility] ([facility]). Interpreters are available.     For health questions: Call 778-589-4054 or 1-867.179.9518 (7 a.m. to 7 p.m.)  For questions about schools and childcare: Call 810-476-8806 or 1-814.745.4029 (7 a.m. to 7 p.m.)   Diagnosis: Acute upper respiratory infection, unspecified  Diagnosis ICD: J06.9

## 2020-07-01 ENCOUNTER — AMBULATORY - HEALTHEAST (OUTPATIENT)
Dept: FAMILY MEDICINE | Facility: CLINIC | Age: 65
End: 2020-07-01

## 2020-07-01 DIAGNOSIS — Z20.822 SUSPECTED COVID-19 VIRUS INFECTION: ICD-10-CM

## 2020-07-03 ENCOUNTER — COMMUNICATION - HEALTHEAST (OUTPATIENT)
Dept: FAMILY MEDICINE | Facility: CLINIC | Age: 65
End: 2020-07-03

## 2020-07-13 ENCOUNTER — COMMUNICATION - HEALTHEAST (OUTPATIENT)
Dept: INTERNAL MEDICINE | Facility: CLINIC | Age: 65
End: 2020-07-13

## 2020-07-13 DIAGNOSIS — E11.9 TYPE 2 DIABETES MELLITUS WITHOUT COMPLICATION, WITHOUT LONG-TERM CURRENT USE OF INSULIN (H): ICD-10-CM

## 2020-07-27 ENCOUNTER — COMMUNICATION - HEALTHEAST (OUTPATIENT)
Dept: INTERNAL MEDICINE | Facility: CLINIC | Age: 65
End: 2020-07-27

## 2020-07-27 DIAGNOSIS — R80.9 MICROALBUMINURIA: ICD-10-CM

## 2020-07-28 ENCOUNTER — COMMUNICATION - HEALTHEAST (OUTPATIENT)
Dept: INTERNAL MEDICINE | Facility: CLINIC | Age: 65
End: 2020-07-28

## 2020-07-28 DIAGNOSIS — E11.9 TYPE 2 DIABETES MELLITUS WITHOUT COMPLICATION, WITHOUT LONG-TERM CURRENT USE OF INSULIN (H): ICD-10-CM

## 2020-11-01 ENCOUNTER — COMMUNICATION - HEALTHEAST (OUTPATIENT)
Dept: INTERNAL MEDICINE | Facility: CLINIC | Age: 65
End: 2020-11-01

## 2020-11-01 DIAGNOSIS — E78.5 HYPERLIPIDEMIA: ICD-10-CM

## 2020-11-05 ENCOUNTER — COMMUNICATION - HEALTHEAST (OUTPATIENT)
Dept: INTERNAL MEDICINE | Facility: CLINIC | Age: 65
End: 2020-11-05

## 2020-11-05 DIAGNOSIS — E11.9 TYPE 2 DIABETES MELLITUS WITHOUT COMPLICATION, WITHOUT LONG-TERM CURRENT USE OF INSULIN (H): ICD-10-CM

## 2020-11-07 ENCOUNTER — COMMUNICATION - HEALTHEAST (OUTPATIENT)
Dept: INTERNAL MEDICINE | Facility: CLINIC | Age: 65
End: 2020-11-07

## 2020-11-07 DIAGNOSIS — E11.9 DM2 (DIABETES MELLITUS, TYPE 2) (H): ICD-10-CM

## 2020-11-09 RX ORDER — BLOOD SUGAR DIAGNOSTIC
STRIP MISCELLANEOUS
Qty: 100 STRIP | Refills: 5 | Status: SHIPPED | OUTPATIENT
Start: 2020-11-09 | End: 2021-07-07

## 2020-11-17 ENCOUNTER — RECORDS - HEALTHEAST (OUTPATIENT)
Dept: ADMINISTRATIVE | Facility: OTHER | Age: 65
End: 2020-11-17

## 2020-11-17 ENCOUNTER — COMMUNICATION - HEALTHEAST (OUTPATIENT)
Dept: SCHEDULING | Facility: CLINIC | Age: 65
End: 2020-11-17

## 2021-01-31 ENCOUNTER — COMMUNICATION - HEALTHEAST (OUTPATIENT)
Dept: INTERNAL MEDICINE | Facility: CLINIC | Age: 66
End: 2021-01-31

## 2021-01-31 DIAGNOSIS — R80.9 MICROALBUMINURIA: ICD-10-CM

## 2021-01-31 DIAGNOSIS — E78.5 HYPERLIPIDEMIA: ICD-10-CM

## 2021-02-16 ENCOUNTER — RECORDS - HEALTHEAST (OUTPATIENT)
Dept: ADMINISTRATIVE | Facility: OTHER | Age: 66
End: 2021-02-16

## 2021-02-16 LAB — RETINOPATHY: NEGATIVE

## 2021-02-22 ENCOUNTER — RECORDS - HEALTHEAST (OUTPATIENT)
Dept: HEALTH INFORMATION MANAGEMENT | Facility: CLINIC | Age: 66
End: 2021-02-22

## 2021-05-03 ENCOUNTER — COMMUNICATION - HEALTHEAST (OUTPATIENT)
Dept: INTERNAL MEDICINE | Facility: CLINIC | Age: 66
End: 2021-05-03

## 2021-05-03 ENCOUNTER — OFFICE VISIT - HEALTHEAST (OUTPATIENT)
Dept: INTERNAL MEDICINE | Facility: CLINIC | Age: 66
End: 2021-05-03

## 2021-05-03 DIAGNOSIS — Z12.11 SCREEN FOR COLON CANCER: ICD-10-CM

## 2021-05-03 DIAGNOSIS — E78.5 HYPERLIPIDEMIA, UNSPECIFIED HYPERLIPIDEMIA TYPE: ICD-10-CM

## 2021-05-03 DIAGNOSIS — E78.5 HYPERLIPIDEMIA: ICD-10-CM

## 2021-05-03 DIAGNOSIS — E11.9 TYPE 2 DIABETES MELLITUS WITHOUT COMPLICATION, WITHOUT LONG-TERM CURRENT USE OF INSULIN (H): ICD-10-CM

## 2021-05-03 DIAGNOSIS — Z12.11 SCREENING FOR COLON CANCER: ICD-10-CM

## 2021-05-03 DIAGNOSIS — R80.9 MICROALBUMINURIA: ICD-10-CM

## 2021-05-03 LAB
ALBUMIN SERPL-MCNC: 4.3 G/DL (ref 3.5–5)
ALP SERPL-CCNC: 88 U/L (ref 45–120)
ALT SERPL W P-5'-P-CCNC: 34 U/L (ref 0–45)
ANION GAP SERPL CALCULATED.3IONS-SCNC: 11 MMOL/L (ref 5–18)
AST SERPL W P-5'-P-CCNC: 24 U/L (ref 0–40)
BILIRUB SERPL-MCNC: 1.3 MG/DL (ref 0–1)
BUN SERPL-MCNC: 15 MG/DL (ref 8–22)
CALCIUM SERPL-MCNC: 9.6 MG/DL (ref 8.5–10.5)
CHLORIDE BLD-SCNC: 104 MMOL/L (ref 98–107)
CHOLEST SERPL-MCNC: 139 MG/DL
CO2 SERPL-SCNC: 25 MMOL/L (ref 22–31)
CREAT SERPL-MCNC: 0.64 MG/DL (ref 0.6–1.1)
CREAT UR-MCNC: 120.7 MG/DL
FASTING STATUS PATIENT QL REPORTED: YES
GFR SERPL CREATININE-BSD FRML MDRD: >60 ML/MIN/1.73M2
GLUCOSE BLD-MCNC: 111 MG/DL (ref 70–125)
HBA1C MFR BLD: 7.5 %
HDLC SERPL-MCNC: 47 MG/DL
LDLC SERPL CALC-MCNC: 58 MG/DL
MICROALBUMIN UR-MCNC: 9.19 MG/DL (ref 0–1.99)
MICROALBUMIN/CREAT UR: 76.1 MG/G
POTASSIUM BLD-SCNC: 4.2 MMOL/L (ref 3.5–5)
PROT SERPL-MCNC: 7.2 G/DL (ref 6–8)
SODIUM SERPL-SCNC: 140 MMOL/L (ref 136–145)
TRIGL SERPL-MCNC: 172 MG/DL

## 2021-05-03 RX ORDER — SEMAGLUTIDE 1.34 MG/ML
1 INJECTION, SOLUTION SUBCUTANEOUS WEEKLY
Qty: 3 ML | Refills: 2 | Status: SHIPPED | OUTPATIENT
Start: 2021-05-03 | End: 2023-03-02

## 2021-05-03 RX ORDER — METFORMIN HCL 500 MG
500 TABLET, EXTENDED RELEASE 24 HR ORAL DAILY
Qty: 90 TABLET | Refills: 0 | Status: SHIPPED | OUTPATIENT
Start: 2021-05-03 | End: 2021-09-16

## 2021-05-03 RX ORDER — NYSTATIN AND TRIAMCINOLONE ACETONIDE 100000; 1 [USP'U]/G; MG/G
OINTMENT TOPICAL
Status: SHIPPED | COMMUNITY
Start: 2020-09-24 | End: 2021-12-17

## 2021-05-03 RX ORDER — ROSUVASTATIN CALCIUM 10 MG/1
10 TABLET, COATED ORAL AT BEDTIME
Qty: 90 TABLET | Refills: 0 | Status: SHIPPED | OUTPATIENT
Start: 2021-05-03 | End: 2021-08-17

## 2021-05-03 RX ORDER — LISINOPRIL 2.5 MG/1
2.5 TABLET ORAL DAILY
Qty: 90 TABLET | Refills: 0 | Status: SHIPPED | OUTPATIENT
Start: 2021-05-03 | End: 2021-08-03

## 2021-05-21 ENCOUNTER — RECORDS - HEALTHEAST (OUTPATIENT)
Dept: ADMINISTRATIVE | Facility: OTHER | Age: 66
End: 2021-05-21

## 2021-05-26 ENCOUNTER — RECORDS - HEALTHEAST (OUTPATIENT)
Dept: ADMINISTRATIVE | Facility: CLINIC | Age: 66
End: 2021-05-26

## 2021-05-27 ENCOUNTER — RECORDS - HEALTHEAST (OUTPATIENT)
Dept: ADMINISTRATIVE | Facility: CLINIC | Age: 66
End: 2021-05-27

## 2021-05-28 NOTE — PROGRESS NOTES
MTM Follow Up Encounter  Assessment & Plan                                                     1. Type 2 diabetes mellitus  Unfortunately A1c has increased to 9%, above goal of less than 7% per ADA guidelines.  She is currently on maximum tolerated dose of Ozempic, and glipizide.  There is room to increase dose of glipizide, but patient would prefer to be off this medication due to weight gain concerns.  Discussed other options, including SGLT2 inhibitor which patient is agreeable to start. Medication education and counseling was provided, including indication, expected effect, dosing instructions, and possible side effects. The patient's questions were answered.  Did discuss possibility of changing glucose monitor to freestyle noelel continuous glucose sensor system, which was generally well covered with [employer] insurance.  Patient declined but is interested in meeting with diabetes educator for a 2-week glucose sensor.  Plan   1. Check A1c.   2. Stop glipizide.   3. Start Jardiance 10 mg 1 tablet every morning.     2. Microalbuminuria  Blood pressure is well controlled and meeting goal of <140/90 mm Hg per JNC-8 hypertension guidelines. History of microalbuminuria. She experienced leg cramps with losartan. Cough with lisinopril, although she questions if this is truly related to the medication or having a cold at the time.  She would like to restart to see if cough is truly side effect from the medication.  Plan   1.  Restart lisinopril 2.5 mg once daily.    3. Hyperlipidemia  Appropriately on a moderate intensity statin given age and diabetes diagnosis per ACC/AHA guidelines. Last LDL of 86 mg/dl.       Follow Up  Return in about 6 weeks (around 6/10/2019).      Subjective & Objective                                                     Helen Chavez is a 64 y.o. female coming in for a follow up visit for Medication Therapy Management. She was referred to me from Clover Felix MD    Chief  Complaint: Follow up med changes; lab work  Medication Adherence/Access: Good; no issues identified.     1. Type 2 diabetes mellitus  Helen is taking Ozempic 1 mg SQ weekly, glipizide ER 5 or 10 mg daily, and metformin  mg daily. At a previous visit, we stopped glipizide and started Ozempic. Even with higher dose of Ozempic, BG were still not well controlled, so we restarted glipizide. She's not sure if she's taking 5 mg or 10 mg tablets. She is unable to tolerate a higher dose of metformin due to diarrhea. She has seen a decrease in her appetite. No lows. She feels her diet is very healthy, but she needs to do better about exercise. She's seen a nutritionist.   Before meals: 110-120   AM: 130-150     Lab Results   Component Value Date    HGBA1C 9.0 (H) 04/29/2019    HGBA1C 8.0 (H) 05/03/2018    HGBA1C 7.1 (H) 11/01/2017     Lab Results   Component Value Date    MICROALBUR 13.69 (H) 04/29/2019    LDLCALC 86 08/01/2017    CREATININE 0.72 04/29/2019       2. Microalbuminuria  Helen stopped taking losartan 25 mg, as she was having severe leg cramps. This resolved after a few days of stopping the medication. We changed from lisinopril due to cough, although she wonders if she just had a cold at the time. She takes this primarily for renal protection.     3. Hyperlipidemia  Helen is taking rosuvastatin 10 mg daily.  She states that she takes this faithfully every evening and understands it is for cholesterol.  No adverse effects noted. Previously, LDL has been as high as 204 mg/dl. She had her cholesterol checked at work and LDL was 95.     The 10-year ASCVD risk score ([place] KENDELL Jr., et al., 2013) is: 10.4%    Values used to calculate the score:      Age: 64 years      Sex: Female      Is Non- : No      Diabetic: Yes      Tobacco smoker: No      Systolic Blood Pressure: 132 mmHg      Is BP treated: No      HDL Cholesterol: 50 mg/dL      Total Cholesterol: 162 mg/dL      PMH: reviewed in  EPIC   Allergies/ADRs: reviewed in EPIC   Alcohol: reviewed in EPIC   Tobacco:   Social History     Tobacco Use   Smoking Status Never Smoker   Smokeless Tobacco Never Used     Today's Vitals:   BP Readings from Last 3 Encounters:   03/01/19 132/74   01/21/19 132/80   01/08/19 124/72     Pulse Readings from Last 3 Encounters:   03/01/19 76   01/21/19 70   01/08/19 66     Wt Readings from Last 3 Encounters:   03/01/19 (!) 224 lb 14.4 oz (102 kg)   01/21/19 (!) 227 lb (103 kg)   01/08/19 (!) 227 lb (103 kg)     ---------------    The patient was given a summary of these recommendations as an after visit summary    I spent 45 minutes with this patient today.   All changes were made via collaborative practice agreement with Hunter Wayne CNP. A copy of the visit note was provided to the patient's provider.     Velia Cuellar, PharmD, BCACP  Medication Management Pharmacist  [facility]        Current Outpatient Medications   Medication Sig Dispense Refill     aspirin 81 MG EC tablet Take 1 tablet (81 mg total) by mouth daily.  0     blood glucose test (ACCU-CHEK SMARTVIEW TEST STRIP) strips Use twice a day or as directed 100 strip 0     empagliflozin (JARDIANCE) 10 mg Tab Take 1 tablet (10 mg total) by mouth daily. 30 tablet 3     lancets 25 gauge Misc Use 1 each As Directed 2 (two) times a day.       lisinopril (ZESTRIL) 2.5 MG tablet Take 1 tablet (2.5 mg total) by mouth daily. 30 tablet 3     metFORMIN (GLUCOPHAGE-XR) 500 MG 24 hr tablet Take 1 tablet (500 mg total) by mouth daily. 90 tablet 3     rosuvastatin (CRESTOR) 10 MG tablet Take 1 tablet (10 mg total) by mouth at bedtime 90 tablet 3     semaglutide (OZEMPIC) 1 mg/0.75 mL (2 mg/1.5 mL) PnIj Inject 1 mg under the skin every 7 days. 3 mL 3     UNABLE TO FIND Med Name: Lumeg A-Z vitamin for eye       No current facility-administered medications for this visit.

## 2021-05-28 NOTE — PATIENT INSTRUCTIONS - HE
Recommendations from today's Medication Management (MTM) visit:                                                      1. Make an appointment to establish care with another doctor. Here the options for internists are Dr. Aryan Felix and Dr. Olvin Nguyen. Otherwise, Dr. Nader Pyle at [facility] is a great family medicine doctor. If you prefer a female, Dr. Olivia Mauro is also wonderful and she's a family medicine doctor at [facility] as well.     2. Stop glipizide.     3. Start Jardiance 10 mg 1 tablet every morning.     4. Restart lisinopril 2.5 mg once daily.     5. Call to schedule appointment with diabetes educator for 2 week glucose sensor.     Next MTM appointment:                                                        Wednesday, June 12th at 2 PM     To schedule another MTM appointment, please call the clinic directly or you may call   the MTM scheduling line at [phone] or toll-free at [phone].     My MTM pharmacist's contact information:                                                      It was a pleasure seeing you today. Thank you for your engagement in getting the most out of your medications! Please feel free to contact me with any questions or concerns you have.      Velia Cuellar, PharmD, BCACP  Medication Management (MTM) Pharmacist  [facility]  [phone]    You may receive a survey about the MTM services you received by email and/or US Mail.  I would appreciate your feedback to help me serve you better in the future. Your comments will be anonymous.

## 2021-05-29 ENCOUNTER — RECORDS - HEALTHEAST (OUTPATIENT)
Dept: ADMINISTRATIVE | Facility: CLINIC | Age: 66
End: 2021-05-29

## 2021-05-29 NOTE — TELEPHONE ENCOUNTER
Patient missed her scheduled dose of Ozempic last Friday, and she is concerned about when to take her next dose.  She would prefer her weekly injection to be on Monday or Wednesdays.  Advised her to give injection today and stick with schedule of every Monday.  She verbalized understanding.

## 2021-05-29 NOTE — PROGRESS NOTES
MTM Follow Up Encounter  Assessment & Plan                                                     1. Type 2 diabetes mellitus  Unfortunately A1c has increased to 9%, above goal of less than 7% per ADA guidelines.  She is currently on maximum dose of Ozempic, highest tolerated dose of metformin, and glipizide.  There is room to increase dose of glipizide, but patient would prefer to be off this medication due to weight gain concerns.  Unfortunately did not tolerate trial of SGLT-2 inhibitor. Could consider TZD, pioglitazone, but patient declines additional medication at this time. She is motivated to try a low carb diet and start exercising. We will follow up in 1 month to check on her progress.     2. Microalbuminuria  Blood pressure is well controlled and meeting goal of <140/90 mm Hg per JNC-8 hypertension guidelines. History of microalbuminuria. Now tolerating low dose lisinopril.    3. Hyperlipidemia  Appropriately on a moderate intensity statin given age and diabetes diagnosis per ACC/AHA guidelines. Last LDL of 86 mg/dl. LFTs tend to fluctuate and were elevated at last check. Have been high in the past prior to starting statin. Will recheck at follow-up. If high, will hold statin and refer to PCP for further evaluation if remain elevated.       Follow Up  Return in about 1 month (around 7/12/2019).      Subjective & Objective                                                     Helen Chavez is a 64 y.o. female coming in for a follow up visit for Medication Therapy Management. She was referred to me from Clover Felix MD. She will be establishing care with Dr. Nguyen in a few months.     Chief Complaint: Follow up med changes  Medication Adherence/Access: Good; no issues identified.     1. Type 2 diabetes mellitus  Helen is taking Ozempic 1 mg SQ weekly, glipizide ER 10 mg daily, and metformin  mg daily. She stopped Jardiance and restarted glipizide as Jardiance caused severe yeast infection  and patient refuses to retry. Treated yeast infection with Monistat and fluconazole.   At a previous visit, we stopped glipizide and started Ozempic. Even with higher dose of Ozempic, BG were still not well controlled, so we restarted glipizide. She is unable to tolerate a higher dose of metformin due to diarrhea. She recently saw her gynecologist who recommended a low carb, Paleo diet. BG this morning was 123.     Lab Results   Component Value Date    HGBA1C 9.0 (H) 04/29/2019    HGBA1C 8.0 (H) 05/03/2018    HGBA1C 7.1 (H) 11/01/2017     Lab Results   Component Value Date    MICROALBUR 13.69 (H) 04/29/2019    LDLCALC 86 08/01/2017    CREATININE 0.72 04/29/2019       2. Microalbuminuria  Helen is taking lisinopril 2.5 mg daily. No cough since restarting. She had stopped taking losartan 25 mg, as she was having severe leg cramps. This resolved after a few days of stopping the medication. She takes this primarily for renal protection.     3. Hyperlipidemia  Helen is taking rosuvastatin 10 mg daily.  She states that she takes this faithfully every evening and understands it is for cholesterol.  No adverse effects noted, although LFTs have fluctuated, even prior to statin.   Previously, LDL has been as high as 204 mg/dl. She had her cholesterol checked at work and LDL was 95.     The 10-year ASCVD risk score ([place] KENDELL Jr., et al., 2013) is: 10.1%    Values used to calculate the score:      Age: 64 years      Sex: Female      Is Non- : No      Diabetic: Yes      Tobacco smoker: No      Systolic Blood Pressure: 130 mmHg      Is BP treated: No      HDL Cholesterol: 50 mg/dL      Total Cholesterol: 162 mg/dL      PMH: reviewed in EPIC   Allergies/ADRs: reviewed in EPIC   Alcohol: reviewed in EPIC   Tobacco:   Social History     Tobacco Use   Smoking Status Never Smoker   Smokeless Tobacco Never Used     Today's Vitals:   BP Readings from Last 3 Encounters:   06/12/19 130/70   03/01/19 132/74    01/21/19 132/80     Pulse Readings from Last 3 Encounters:   06/12/19 72   03/01/19 76   01/21/19 70     Wt Readings from Last 3 Encounters:   06/12/19 (!) 224 lb (101.6 kg)   03/01/19 (!) 224 lb 14.4 oz (102 kg)   01/21/19 (!) 227 lb (103 kg)     ---------------    The patient was given a summary of these recommendations as an after visit summary    I spent 30 minutes with this patient today.   All changes were made via collaborative practice agreement with Hunter Wayne CNP. A copy of the visit note was provided to the patient's provider.     Velia Cuellar, PharmD, BCACP  Medication Management Pharmacist  [facility]        Current Outpatient Medications   Medication Sig Dispense Refill     glipiZIDE (GLUCOTROL XL) 10 MG 24 hr tablet Take 10 mg by mouth daily.       aspirin 81 MG EC tablet Take 1 tablet (81 mg total) by mouth daily.  0     blood glucose test (ACCU-CHEK SMARTVIEW TEST STRIP) strips Use twice a day or as directed 100 strip 0     lancets 25 gauge Misc Use 1 each As Directed 2 (two) times a day.       lisinopril (ZESTRIL) 2.5 MG tablet Take 1 tablet (2.5 mg total) by mouth daily. 30 tablet 3     metFORMIN (GLUCOPHAGE-XR) 500 MG 24 hr tablet Take 1 tablet (500 mg total) by mouth daily. 90 tablet 3     rosuvastatin (CRESTOR) 10 MG tablet Take 1 tablet (10 mg total) by mouth at bedtime 90 tablet 3     semaglutide (OZEMPIC) 1 mg/dose (2 mg/1.5 mL) PnIj Inject 1 mg under the skin every 7 days. 3 mL 3     UNABLE TO FIND Med Name: Lumeg A-Z vitamin for eye       No current facility-administered medications for this visit.

## 2021-05-29 NOTE — TELEPHONE ENCOUNTER
Who is calling:  Patient  Reason for Call:  Requests a call back from Velia Cuellar regarding her OZEMPIC  Date of last appointment with primary care: 6/12/19 with Velia Cuellar for [facility]  Okay to leave a detailed message: Yes

## 2021-05-30 VITALS — WEIGHT: 230.38 LBS | BODY MASS INDEX: 37.18 KG/M2

## 2021-05-30 VITALS — BODY MASS INDEX: 38.2 KG/M2 | WEIGHT: 236.7 LBS

## 2021-05-30 NOTE — TELEPHONE ENCOUNTER
RN cannot approve Refill Request    RN can NOT refill this medication PCP messaged that patient is overdue for Office Visit. Last office visit: 5/3/2018 Clover Felix MD Last Physical: 8/1/2017 Last MTM visit: Visit date not found Last visit same specialty: 1/8/2019 Glenn Wayne CNP.  Next visit within 3 mo: Visit date not found  Next physical within 3 mo: Visit date not found      [facility] Triage/Med Refill 7/26/2019    Requested Prescriptions   Pending Prescriptions Disp Refills     blood glucose test (ACCU-CHEK SMARTVIEW TEST STRIP) strips  0     Sig: USE TO TEST BLOOD SUGARS TWICE DAILY OR AS DIRECTED       Diabetic Supplies Refill Protocol Failed - 7/26/2019  2:43 PM        Failed - Visit with PCP or prescribing provider visit in last 6 months     Last office visit with prescriber/PCP: 5/3/2018 Clover Felix MD OR same dept: 1/8/2019 Glenn Wayne CNP OR same specialty: 1/8/2019 Glenn Wayne CNP  Last physical: 8/1/2017 Last MTM visit: Visit date not found   Next visit within 3 mo: Visit date not found  Next physical within 3 mo: Visit date not found  Prescriber OR PCP: Clover Felix MD  Last diagnosis associated with med order: 1. DM2 (diabetes mellitus, type 2) (H)  - blood glucose test (ACCU-CHEK SMARTVIEW TEST STRIP) strips; USE TO TEST BLOOD SUGARS TWICE DAILY OR AS DIRECTED; Refill: 0    If protocol passes may refill for 12 months if within 3 months of last provider visit (or a total of 15 months).             Passed - A1C in last 6 months     Hemoglobin A1c   Date Value Ref Range Status   04/29/2019 9.0 (H) 3.5 - 6.0 % Final

## 2021-05-30 NOTE — PROGRESS NOTES
MTM Follow Up Encounter  Assessment & Plan                                                     1. Type 2 diabetes mellitus  Great improvement in blood sugars and notable weight loss with dietary changes that she implemented since our last visit.  Now off sulfonylurea as well, but continues with low-dose metformin and GLP-1 agonist Ozempic.  Congratulated her on this progress and encouraged to continue with lifestyle modification.     2. Microalbuminuria  Blood pressure is well controlled and meeting goal of <140/90 mm Hg per JNC-8 hypertension guidelines. History of microalbuminuria. Now tolerating low dose lisinopril.    3. Hyperlipidemia  Appropriately on a moderate intensity statin given age and diabetes diagnosis per ACC/AHA guidelines. Last LDL of 86 mg/dl. LFTs tend to fluctuate and were elevated at last check. Have been high in the past prior to starting statin. Will recheck with labwork next month. If high, can considering holding statin and refer to PCP for further evaluation.      Follow Up  Return in about 1 month (around 8/24/2019).      Subjective & Objective                                                     Helen Chavez is a 64 y.o. female coming in for a follow up visit for Medication Therapy Management. She was referred to me from Clover Felix MD. She will be establishing care with Dr. Nguyen in a few months.     Chief Complaint: Follow up med changes  Medication Adherence/Access: Good; no issues identified.     1. Type 2 diabetes mellitus  Helen is taking Ozempic 1 mg SQ weekly and metformin  mg daily.  She has been instituting dietary changes to improve blood sugar control and has lost almost 20 pounds recently.  She notes improvement in her blood sugars and we have been able to completely stop glipizide.  Pre-meal blood sugars have been 110-112 and morning fasting readings have been 120-130.  When she does check postprandial readings they are always in the low 100 range.   No recent hypoglycemia.   She also takes low-dose aspirin 81 mg daily.  She notes her father had early heart disease in his 40s.  No signs or symptoms of bleeding.  She did recently high have an eye exam but is due for foot exam.  We have tried SGLT2 inhibitor, Jardiance in the past but this caused a severe yeast infection and patient declines continuing this medication. She is unable to tolerate a higher dose of metformin due to diarrhea.    Lab Results   Component Value Date    HGBA1C 9.0 (H) 04/29/2019    HGBA1C 8.0 (H) 05/03/2018    HGBA1C 7.1 (H) 11/01/2017     Lab Results   Component Value Date    MICROALBUR 13.69 (H) 04/29/2019    LDLCALC 86 08/01/2017    CREATININE 0.72 04/29/2019       2. Microalbuminuria  Helen is taking lisinopril 2.5 mg daily. No cough since restarting. She had stopped taking losartan 25 mg, as she was having severe leg cramps. This resolved after a few days of stopping the medication. She takes this primarily for renal protection.     3. Hyperlipidemia  Helen is taking rosuvastatin 10 mg daily.  She states that she takes this faithfully every evening and understands it is for cholesterol.  No adverse effects noted, although LFTs have fluctuated, even prior to statin.   Previously, LDL has been as high as 204 mg/dl. She had her cholesterol checked at work and LDL was 95.     The 10-year ASCVD risk score (Sulma KENDELL Jr., et al., 2013) is: 9.5%    Values used to calculate the score:      Age: 64 years      Sex: Female      Is Non- : No      Diabetic: Yes      Tobacco smoker: No      Systolic Blood Pressure: 126 mmHg      Is BP treated: No      HDL Cholesterol: 50 mg/dL      Total Cholesterol: 162 mg/dL      PMH: reviewed in EPIC   Allergies/ADRs: reviewed in EPIC   Alcohol: reviewed in EPIC   Tobacco:   Social History     Tobacco Use   Smoking Status Never Smoker   Smokeless Tobacco Never Used     Today's Vitals:   BP Readings from Last 3 Encounters:   07/24/19  126/79   06/12/19 130/70   03/01/19 132/74     Pulse Readings from Last 3 Encounters:   07/24/19 82   06/12/19 72   03/01/19 76     Wt Readings from Last 3 Encounters:   07/24/19 208 lb 4.8 oz (94.5 kg)   06/12/19 (!) 224 lb (101.6 kg)   03/01/19 (!) 224 lb 14.4 oz (102 kg)     ---------------    The patient was given a summary of these recommendations as an after visit summary    I spent 30 minutes with this patient today.   All changes were made via collaborative practice agreement with Hunter Wayne CNP. A copy of the visit note was provided to the patient's provider.     Velia Cuellar, PharmD, BCACP  Medication Management Pharmacist  [facility]        Current Outpatient Medications   Medication Sig Dispense Refill     aspirin 81 MG EC tablet Take 1 tablet (81 mg total) by mouth daily.  0     blood glucose test (ACCU-CHEK SMARTVIEW TEST STRIP) strips Use twice a day or as directed 100 strip 0     lancets 25 gauge Misc Use 1 each As Directed 2 (two) times a day.       lisinopril (ZESTRIL) 2.5 MG tablet Take 1 tablet (2.5 mg total) by mouth daily. 30 tablet 3     metFORMIN (GLUCOPHAGE-XR) 500 MG 24 hr tablet Take 1 tablet (500 mg total) by mouth daily. 90 tablet 3     rosuvastatin (CRESTOR) 10 MG tablet Take 1 tablet (10 mg total) by mouth at bedtime 90 tablet 3     semaglutide (OZEMPIC) 1 mg/dose (2 mg/1.5 mL) PnIj Inject 1 mg under the skin every 7 days. 3 mL 3     UNABLE TO FIND Med Name: Lumeg A-Z vitamin for eye       No current facility-administered medications for this visit.

## 2021-05-30 NOTE — TELEPHONE ENCOUNTER
Refill Request  Did you contact pharmacy: Yes.  Date: 7/26/19  Medication name:   Requested Prescriptions     Pending Prescriptions Disp Refills     blood glucose test (ACCU-CHEK SMARTVIEW TEST STRIP) strips [Pharmacy Med Name: ACCU-CHEK SMARTVIEW  STRP]  0     Sig: USE TO TEST BLOOD SUGARS TWICE DAILY OR AS DIRECTED     Who prescribed the medication: Dr. Clover Felix  [facility] and [facility]: [facility]  Is patient out of medication: Yes  Patient notified refills processed in 72 hours:  yes  Okay to leave a detailed message: yes, please reach out to patient once refill, [phone].    Patient is out of test strips and needs a new prescription sent as soon as possible. Patient is not seeing Dr Nguyen to establish care until August. Patient saw Velia Cuellar, PharmD 7/24/19. Please reach out to patient and advise.

## 2021-05-31 VITALS — WEIGHT: 219.44 LBS | BODY MASS INDEX: 34.89 KG/M2

## 2021-05-31 VITALS — WEIGHT: 225.25 LBS | BODY MASS INDEX: 35.35 KG/M2 | HEIGHT: 67 IN

## 2021-05-31 VITALS — BODY MASS INDEX: 35.06 KG/M2 | WEIGHT: 220.5 LBS

## 2021-06-01 VITALS — WEIGHT: 217.5 LBS | BODY MASS INDEX: 34.58 KG/M2

## 2021-06-02 VITALS — WEIGHT: 227 LBS | BODY MASS INDEX: 36.09 KG/M2

## 2021-06-02 VITALS — BODY MASS INDEX: 36.09 KG/M2 | WEIGHT: 227 LBS

## 2021-06-02 VITALS — BODY MASS INDEX: 35.76 KG/M2 | WEIGHT: 224.9 LBS

## 2021-06-02 NOTE — PATIENT INSTRUCTIONS - HE
Develop a walking plan to walk 20 minutes 3-4 times a week.    See gynecology this fall or winter for the follow-up on the ovarian cyst.    Schedule your mammogram.    Start vitamin D 2000 IU daily.    Your colonoscopy is due August 2020.    Consider a flu shot this fall.    You are due for the pneumonia vaccine, called Prevnar 13.    You may be due for a tetanus shot, if it is been over 10 years since her last tetanus shot.    Continue on current medications.    Follow-up with me in 3 to 4 months for a nonfasting follow-up appointment.

## 2021-06-02 NOTE — TELEPHONE ENCOUNTER
RN cannot approve Refill Request    RN can NOT refill this medication med is not covered by policy/route to provider. Last office visit: 1/8/2019 Glenn Wayne CNP Last Physical: Visit date not found Last MTM visit: Visit date not found Last visit same specialty: 10/24/2019 Olvin Nguyen MD.  Next visit within 3 mo: Visit date not found  Next physical within 3 mo: Visit date not found      Porsche Toth, [facility] Triage/Med Refill 10/24/2019    Requested Prescriptions   Pending Prescriptions Disp Refills     OZEMPIC 1 mg/dose (2 mg/1.5 mL) PnIj [Pharmacy Med Name: OZEMPIC 1MG/DOSE SOPN] 3 mL 3     Sig: INJECT 1 MG UNDER SKIN EVERY 7 DAYS.       There is no refill protocol information for this order

## 2021-06-03 VITALS
DIASTOLIC BLOOD PRESSURE: 62 MMHG | SYSTOLIC BLOOD PRESSURE: 110 MMHG | HEART RATE: 72 BPM | BODY MASS INDEX: 33.07 KG/M2 | WEIGHT: 208 LBS

## 2021-06-03 VITALS — BODY MASS INDEX: 33.12 KG/M2 | WEIGHT: 208.3 LBS

## 2021-06-03 VITALS — WEIGHT: 224 LBS | BODY MASS INDEX: 35.61 KG/M2

## 2021-06-04 VITALS
DIASTOLIC BLOOD PRESSURE: 78 MMHG | SYSTOLIC BLOOD PRESSURE: 128 MMHG | HEART RATE: 56 BPM | BODY MASS INDEX: 31.16 KG/M2 | WEIGHT: 196 LBS

## 2021-06-04 NOTE — TELEPHONE ENCOUNTER
I did review the pelvic ultrasound done at gynecology with stable left ovarian cyst with cyst or cyst.  I did see that the patient saw gynecology this month consult, with plan to repeat ultrasound in 1 year to repeat imaging of the ovarian cyst, to be ordered by gynecology.    4.4 mm endometrial stripe, no uterine mass.

## 2021-06-05 VITALS
SYSTOLIC BLOOD PRESSURE: 132 MMHG | HEART RATE: 68 BPM | WEIGHT: 202 LBS | DIASTOLIC BLOOD PRESSURE: 72 MMHG | BODY MASS INDEX: 32.12 KG/M2

## 2021-06-05 NOTE — PATIENT INSTRUCTIONS - HE
Continue with current medication treatment plan.    Find a place to walk on a regular basis in the winter.  Goal for 20 minutes of walking 3 times a week or more.    You appear due for a tetanus shot.  At age 64 a pneumonia vaccine for pneumococcal pneumonia, or Prevnar 13, is recommended.  Consider getting the Prevnar 13 pneumonia vaccine.    Routine follow-up in 3 to 4 months with me.  You do not need to fast for your next appointment.    Colonoscopy will be due in August of this year.    Repeat ovarian cyst ultrasound in December.    Yearly eye exam will be due in December.

## 2021-06-05 NOTE — PROGRESS NOTES
[facility] clinic Follow Up Note    Helen Chavez   64 y.o. female    Date of Visit: 1/20/2020    Chief Complaint   Patient presents with     Follow-up     3 month non-fasting checkup     Subjective  Anne is here for follow-up on diabetes and other medical issues.    Overall she feels well.    Ozempic started earlier last year and she is tolerating well at a milligram a week.  No loose stools or GI upset.    Previous loose stools with metformin and she is limited that dose to Metformin  mg a day.    She continues to lose weight, feels she is eating a healthy diet.  Her blood sugars are averaging around 122.  In April of last year hemoglobin A1c was 9%.  In October of last year hemoglobin A1c was 7.2%.    She saw her retina specialist last month, she denies retinopathy.  She states she is now on a yearly follow-up plan.  April 2019 urine microalbumin level 100.    She is taking lisinopril 2.5 mg a day.  Her blood pressure has been normal and she denies orthostasis or edema.    No chest pain or chest pressure.  No palpitations.  Strong family history of coronary disease with father having an MI in his 40s.  She has not had further cardiac evaluation.    She continues on aspirin 81 mg.  No epigastric pain or bleeding or melena.    No diffuse myalgias on Crestor 10 mg.  Previous LDL was 204, but April 2019 .    Fatty liver in 2015 ultrasound.  No significant alcohol.  Elevated liver tests in April but liver tests were normal in October.  No right upper quadrant pain or jaundice currently.    No cough or increasing shortness of breath.  She is never smoked.    Bowels are normal and no abdominal pain complaints.  She did have a sessile serrated adenoma in August 2015 colonoscopy with five-year follow-up.    Aunt with breast cancer.  I did review December 2019 mammogram which was negative.    2015 DEXA scan with femur score -1.4/-1.2.  Spine score -0.5.  Moderate trabecular bone.  On 2000 IU  a day vitamin D.    I did review the GYN ultrasound of the left ovarian cyst, actually 2 cysts but they were stable on December 2019 ultrasound with 1 year follow-up plan.  Previous Paps negative and no further plan.        PMHx:    Past Medical History:   Diagnosis Date     Diabetes mellitus (H)      Elevated LFTs      History of anesthesia complications     wild after surgery age 15     Hyperlipidemia      Hypertension      Menopause      Microalbuminuria      Warts of foot     right     PSHx:    Past Surgical History:   Procedure Laterality Date     BREAST BIOPSY Right 1970    benign lump removed     GANGLION CYST EXCISION       KNEE CARTILAGE SURGERY Left      PILONIDAL CYST / SINUS EXCISION       Immunizations:   Immunization History   Administered Date(s) Administered     Influenza,seasonal, Inj IIV3 12/11/2003     Tdap 08/20/2007       ROS A comprehensive review of systems was performed and was otherwise negative    Medications, allergies, and problem list were reviewed and updated    Exam  /78 (Patient Site: Right Arm, Patient Position: Sitting, Cuff Size: Adult Large)   Pulse (!) 56   Wt 196 lb (88.9 kg)   BMI 31.16 kg/m    She appears well.  No jaundice.  Lungs clear.  Heart regular without murmur.  Abdomen is just mildly overweight but nontender 1 cm liver.  No ankle edema.    Assessment/Plan  1. Type 2 diabetes mellitus without complication, without long-term current use of insulin (H)  Appears to be well controlled now.  Continue current medications.  Metformin dose limited by loose stools in the past.    Continue to work on diet and exercise.  Routine follow-up in 3 to 4 months.    Yearly follow-up for ophthalmology is now December.      - semaglutide (OZEMPIC) 1 mg/dose (2 mg/1.5 mL) PnIj; Inject 1 mg under the skin once a week.  Dispense: 3 mL; Refill: 3  - metFORMIN (GLUCOPHAGE-XR) 500 MG 24 hr tablet; Take 1 tablet (500 mg total) by mouth daily.  Dispense: 90 tablet; Refill: 3  -  Glycosylated Hemoglobin A1c    Patient needs to find a place for regular walking exercise in the winter.  We discussed the [facility] in [place]    2. Microalbuminuria  Continue low-dose lisinopril 2.5 mg a day.  Tolerating.  - lisinopril (ZESTRIL) 2.5 MG tablet; Take 1 tablet (2.5 mg total) by mouth daily.  Dispense: 30 tablet; Refill: 3    3. Hypercholesterolemia  Tolerating Crestor 10 mg.  Goal LDL less than 130.  - Lipid Cascade    4. History of fatty infiltration of liver  Improved with weight loss and Ozempic.  Rechecking liver test now.  No alcohol history.    5. Cyst of left ovary  Stable on December 2018 ultrasound, 1 year follow-up plan with GYN.    No further Pap smears with age.    6. History of colonic polyps  Sessile serrated adenoma follow-up will be due August 2020.    7. Encounter for therapeutic drug monitoring    - Comprehensive Metabolic Panel    She continues to refuse flu shot.  I did recommend that patient consider Prevnar 13 vaccine and tetanus shot, patient will check with insurance on that.    Aunt with breast cancer, yearly follow-up with mammogram in December    Return in about 3 months (around 4/20/2020) for Recheck.   Patient Instructions   Continue with current medication treatment plan.    Find a place to walk on a regular basis in the winter.  Goal for 20 minutes of walking 3 times a week or more.    You appear due for a tetanus shot.  At age 64 a pneumonia vaccine for pneumococcal pneumonia, or Prevnar 13, is recommended.  Consider getting the Prevnar 13 pneumonia vaccine.    Routine follow-up in 3 to 4 months with me.  You do not need to fast for your next appointment.    Colonoscopy will be due in August of this year.    Repeat ovarian cyst ultrasound in December.    Yearly eye exam will be due in December.    Olvin Nguyen MD        Current Outpatient Medications   Medication Sig Dispense Refill     aspirin 81 MG EC tablet Take 1 tablet (81 mg total) by mouth daily.  0      blood glucose test (ACCU-CHEK SMARTVIEW TEST STRIP) strips USE TO TEST BLOOD SUGARS TWICE DAILY OR AS DIRECTED 100 strip 0     cholecalciferol, vitamin D3, 2,000 unit Tab Take 1 tablet by mouth daily.       lancets 25 gauge Misc Use 1 each As Directed 2 (two) times a day.       lisinopril (ZESTRIL) 2.5 MG tablet Take 1 tablet (2.5 mg total) by mouth daily. 30 tablet 3     metFORMIN (GLUCOPHAGE-XR) 500 MG 24 hr tablet Take 1 tablet (500 mg total) by mouth daily. 90 tablet 3     rosuvastatin (CRESTOR) 10 MG tablet Take 1 tablet (10 mg total) by mouth at bedtime 90 tablet 3     semaglutide (OZEMPIC) 1 mg/dose (2 mg/1.5 mL) PnIj Inject 1 mg under the skin once a week. 3 mL 3     UNABLE TO FIND Med Name: Lumeg A-Z vitamin for eye       No current facility-administered medications for this visit.      Allergies   Allergen Reactions     Jardiance [Empagliflozin] Other (See Comments)     Yeast infection, constipation     Losartan Myalgia     Muscle cramps     Penicillins Hives     Social History     Tobacco Use     Smoking status: Former Smoker     Packs/day: 0.00     Years: 10.00     Pack years: 0.00     Types: Cigarettes     Start date:      Last attempt to quit:      Years since quittin.0     Smokeless tobacco: Never Used   Substance Use Topics     Alcohol use: Not Currently     Frequency: Monthly or less     Drinks per session: 1 or 2     Drug use: No

## 2021-06-07 NOTE — TELEPHONE ENCOUNTER
Who is calling:    Patient    Reason for Call:    semaglutide (OZEMPIC)     Date of last appointment with primary care: 01/20/2020    Okay to leave a detailed message: Yes    Patient is out of needles and is requesting [facility] call to discuss picking up two needles, and send a script for needles to her pharmacy.  She states the dose was changed and she is out of needles.

## 2021-06-08 NOTE — TELEPHONE ENCOUNTER
Pt informed and will call her [facility] pharmacy again today and let us know if she wants a sooner appt with PCP.

## 2021-06-08 NOTE — TELEPHONE ENCOUNTER
Medication Question or Clarification  Who is calling: Patient.  What medication are you calling about (include dose and sig)?:   metFORMIN (GLUCOPHAGE-XR) 500 MG 24 hr tablet  90 tablet  3  1/20/2020      Sig - Route: Take 1 tablet (500 mg total) by mouth daily. - Oral         Who prescribed the medication?: Dr Nguyen  What is your question/concern?: Patient states she read in the [employer] that this medication is being recalled due to FDA found high levels of a cancer causing agent in some of the manufactures that make it.  Please call and advise.  Requested Pharmacy: [facility] to leave a detailed message?: Yes

## 2021-06-08 NOTE — TELEPHONE ENCOUNTER
Spoke with pt, she has many questions that I am unable to answer. She is wondering if you would be willing if you call and speak with her?

## 2021-06-08 NOTE — TELEPHONE ENCOUNTER
Have patient schedule a phone consult with me to discuss her questions.  She will need to call her pharmacist to see if her metformin is affected by the recall.  There are only a few manufacturers with this is affected.  Likely she is okay to continue her current metformin.  Have her contact her pharmacist where she gets her medications.

## 2021-06-08 NOTE — TELEPHONE ENCOUNTER
Please advise patient contact her pharmacy to see if the metformin they dispensed to her was affected. The recalls are only for a select few lots/manufacturers of metformin. Most metformin is supplied from manufacturers that are not affected by the recall.

## 2021-06-09 NOTE — TELEPHONE ENCOUNTER
"Refill Approved    Rx renewed per Medication Renewal Policy. Medication was last renewed on 04/16/2020.  Last office visit was 01/20/2020 with PCP.    Rosa Isela Arenas, [facility] Triage/Med Refill 7/13/2020     Requested Prescriptions   Pending Prescriptions Disp Refills     BD MELINA 2ND GEN PEN NEEDLE 32 gauge x 5/32\" Ndle [Pharmacy Med Name: BD PEN NEEDLE MELINA  32G X 4 MM MISC] 10 each 11     Sig: USE ONCE WEEKLY WITH OZEMPIC.       Diabetic Supplies Refill Protocol Passed - 7/13/2020  4:23 AM        Passed - Visit with PCP or prescribing provider visit in last 6 months     Last office visit with prescriber/PCP: Visit date not found OR same dept: 1/20/2020 Olvin Nguyen MD OR same specialty: 1/20/2020 Olvin Nguyen MD  Last physical: Visit date not found Last MTM visit: Visit date not found   Next visit within 3 mo: Visit date not found  Next physical within 3 mo: Visit date not found  Prescriber OR PCP: Renny Hollins MD  Last diagnosis associated with med order: 1. Type 2 diabetes mellitus without complication, without long-term current use of insulin (H)  - BD MELINA 2ND GEN PEN NEEDLE 32 gauge x 5/32\" Ndle [Pharmacy Med Name: BD PEN NEEDLE MELINA  32G X 4 MM MISC]; USE ONCE WEEKLY WITH OZEMPIC.  Dispense: 10 each; Refill: 11    If protocol passes may refill for 12 months if within 3 months of last provider visit (or a total of 15 months).             Passed - A1C in last 6 months     Hemoglobin A1c   Date Value Ref Range Status   01/20/2020 6.8 (H) 3.5 - 6.0 % Final                              "

## 2021-06-09 NOTE — PROGRESS NOTES
Subjective: Helen Chavez is a 61-year-old female who is here today to follow-up on a walk-in clinic visit.  She was diagnosed with an E. coli UTI.  Interestingly, her UA was normal band but her culture was positive.  She thinks her symptoms have resolved but she is not 100% certain.  She is concerned because she said this is the second urinary tract infection she's had in the last year.    She has been dealing with a stye in her right eye since December.  She saw an eye doctor for this.  She believes it was at Dr. Hernandez in [place] [facility].  She was prescribed a salve and a hot pack.  The bump went away but she still has some redness there.  She had follow-up 2-3 weeks ago and was prescribed doxycycline.  She was afraid that doxycycline would yellow her teeth.  She also stopped the doxycycline when she took the antibiotic for her urinary tract infection.  She is wondering if I think it would be safe to resume her antibiotic and I do.    She had been seeing Dr. Jones.  She's due for a f/u on her DM2.  She takes her glipizide regularly, but struggles w/ the metformin.  When she took it in the AM, she had nausea.  When she tries to take it w/ her evening meal, she often forgets it.  She's open to trying it at noon.    Physical exam  General: Pleasant adult female, no acute distress  Vitals:    02/23/17 1430   BP: 120/74   Pulse: 76     Body mass index is 37.18 kg/(m^2).     Diagnoses and all orders for this visit:    History of bladder infections  -     Urinalysis-UC if Indicated    Hyperlipidemia  -     rosuvastatin (CRESTOR) 20 MG tablet; Take 0.5 tablets (10 mg total) by mouth bedtime.  Dispense: 45 tablet; Refill: 1    Microalbuminuria  -     lisinopril (PRINIVIL,ZESTRIL) 2.5 MG tablet; Take 1 tablet by mouth daily  Dispense: 90 tablet; Refill: 1    DM2 (diabetes mellitus, type 2)  -     Glycosylated Hemoglobin A1c  -     metFORMIN (GLUCOPHAGE XR) 500 MG 24 hr tablet; Take 1 tablet (500  mg total) by mouth daily with supper.  Dispense: 90 tablet; Refill: 1    Patient Instructions   A1c and recheck of your urine today.  Try the metformin at lunch time to see if that works better for you.  Please see me again in early summer for your next diabetic follow up and annual exam.  Take care!

## 2021-06-10 NOTE — PROGRESS NOTES
ASSESSMENT/PLAN:   1. Acute sinusitis  cefdinir (OMNICEF) 300 MG capsule   2. Acute otitis media  cefdinir (OMNICEF) 300 MG capsule     Lungs CTAB- no signs of pneumonia (futhermore, no fevers); no wheezing suggesting asthma exacerbation or bronchitis. Though she admits to feeling short of breath, she is not in any respiratory distress here and has great oxygenation her today.  I think cough is most likely due to post-nasal drip from sinusitis. This seems to be a recalcitrant sinusitis - patient seems to have failed a course of clindamycin prescribed for sinusitis at an outside facility. She also has a bilateral otitis media.   She has a PCN allergy, but no history of anaphylaxis. I will prescribe cefdinir.  Did discuss signs of allergic reaction and anaphylaxis, and disposition should this happen, though I think it is exceedingly unlikely.    At the end of the encounter, I discussed diagnosis, medications. Discussed red flags for immediate return to clinic/ER, as well as indications for follow up if no improvement. Patient understood and agreed to plan. Patient was stable for discharge.        Patient Instructions:  Patient Instructions   You do have a bilateral ear infection and sinus infection.    1. Take Omnicef twice daily x 10 days. Take with food. Take a probiotic while on the antibiotic.    2. Use Mucinex twice daily as needed for congestion.    3. Do NetiPot and steamy showers to help clear congestion.    Follow up with your primary care provider if no improvement or worsening symptoms.    Go to ER immediately if developing chest pain, shortness breath, coughing up blood, or any other new, concerning symptoms.                    SUBJECTIVE:   Helen Chavez is a 62 y.o. female with a history of T2DM, who presents today for evaluation of left ear pain and sinus pressure. She was seen at an outside clinic 1.5 weeks ago, at which time she was prescribed clindamycin for a sinus infection. She says sinus  "congestion and sore throat did not get better any better after finishing course of antibiotics. She started having ear pain midway into the antibiotic. She has a cough, which she describes as more dry now. She admits to some shortness of breath \"not all the time,\" not worse with activity.  No hearing changes. No drainage from ear. No fevers since initial diagnosis of sinus infection. No chest pain. No hemoptysis.  Her grandchildren have been ill recently.      Past Medical History:  Patient Active Problem List   Diagnosis     DM2 (diabetes mellitus, type 2)     HLD (hyperlipidemia)     Menopause     Microalbuminuria     Elevated LFTs     Verruca plantaris       Surgical History:  Past Surgical History:   Procedure Laterality Date     BREAST BIOPSY Right 1970    benign lump removed     GANGLION CYST EXCISION       KNEE CARTILAGE SURGERY Left      PILONIDAL CYST / SINUS EXCISION       Reviewed; Non-contributory    Family History:  Family History   Problem Relation Age of Onset     Breast cancer Paternal Aunt 77     Reviewed; Non-contributory      Social History:    History   Smoking Status     Never Smoker   Smokeless Tobacco     Never Used     Smoking: none  Alcohol use: rarely  Other drug use: none  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       glipiZIDE (GLUCOTROL) 5 MG 24 hr tablet Take 1 tablet (5 mg total) by mouth daily with breakfast. 90 tablet 3     ibuprofen (ADVIL) 200 MG tablet Take 400 mg by mouth every 6 (six) hours as needed for pain.       lancets 25 gauge Misc Use 1 each As Directed 2 (two) times a day.       lisinopril (PRINIVIL,ZESTRIL) 2.5 MG tablet Take 1 tablet by mouth daily 90 tablet 1     rosuvastatin (CRESTOR) 20 MG tablet Take 0.5 tablets (10 mg " total) by mouth bedtime. 45 tablet 1     metFORMIN (GLUCOPHAGE XR) 500 MG 24 hr tablet Take 1 tablet (500 mg total) by mouth daily with supper. 90 tablet 1     [DISCONTINUED] doxycycline (MONODOX) 100 MG capsule Take 100 mg by mouth 2 (two) times a day.       No current facility-administered medications on file prior to visit.        Allergies:   Allergies   Allergen Reactions     Penicillins Hives       I personally reviewed patient's past medical, surgical, social, family history and allergies.    ROS:  Review of Systems  Complete 10pt ROS in HPI, otherwise negative.      OBJECTIVE:   /70  Pulse 79  Temp 97.1  F (36.2  C) (Oral)   Resp 16  SpO2 96%  Breastfeeding? Yes      General Appearance:  Alert, well-appearing older female in NAD. Afebrile. Talkative, pleasant, appears comfortable.   Integument: Warm, dry.  HEENT:  Head: Atraumatic, normocephalic. Face nontraumatic.  Eyes: Conjunctiva clear, Lids normal.  Ears:  TMs mildly erythematous with marked effusion bilaterally and no visible landmarks. No canal erythema or edema.  Nose: nares patent. ++ erythema of nasal mucosa. No rhinorrhea. + maxillary sinus TTP.  Oropharynx:  No trismus. Mild posterior pharyngeal erythema. No petechiae, no exudate. No tonsillar hypertrophy. Uvula midline. Moist mucus membranes.  Neck: Supple, no lymphadenopathy. No meningismus.  Respiratory: No distress. Equal inspiration to bilateral bases. Lungs clear to ausculation bilaterally. No crackles, wheezes, rhonchi or stridor.  Cardiovascular: Regular rate and rhythm, no murmur, rub or gallop. No obvious chest wall deformities. Peripheral pulses 2+ bilaterally. No peripheral edema.  Neurologic: Alert and orientated appropriately. No focal deficits.

## 2021-06-10 NOTE — TELEPHONE ENCOUNTER
RN cannot approve Refill Request    RN can NOT refill this medication med is not covered by policy/route to provider. Last office visit: 1/20/2020 Olvin Nguyen MD Last Physical: Visit date not found Last MTM visit: Visit date not found Last visit same specialty: 1/20/2020 Olvin Nguyen MD.  Next visit within 3 mo: Visit date not found  Next physical within 3 mo: Visit date not found      Heidy Mccarty, [facility] Triage/Med Refill 7/28/2020    Requested Prescriptions   Pending Prescriptions Disp Refills     OZEMPIC 1 mg/dose (2 mg/1.5 mL) PnIj [Pharmacy Med Name: OZEMPIC (1 MG/DOSE) 2MG/1.5ML SOPN] 3 mL 3     Sig: INJECT 1MG UNDER THE SKIN ONCE A WEEK       There is no refill protocol information for this order

## 2021-06-11 NOTE — PROGRESS NOTES
Subjective findings: The patient return to the clinic today complaining of recurring warts on the bottom of her right foot.  The patient is several months status post CO2 laser ablation of the warts.    Objective findings: Nails bilateral feet are normal growth and color.  Skin bilateral feet warm and intact.  There are multiple hyperkeratotic nucleated lesions on the plantar aspect of the right heel.  There is pinpoint bleeding noted at the base of these lesions which measured approximately 3.0 cm x 3.0 cm.  DP and PT pulses +2 over 4 bilateral feet.  Capillary refill less than 2 seconds bilateral feet.  Negative clonus, negative Babinski bilateral feet.  Range of motion within normal limits.  Muscle power +45 bilaterally within all components.     Assessment: Verruca plantaris     Plan: I recommended Thuja which is a homeopathic medication which has shown great promise and successfully treating the warts.  She is to return to the clinic if she continues to have recurrence.

## 2021-06-12 NOTE — TELEPHONE ENCOUNTER
RN cannot approve Refill Request    RN can NOT refill this medication PCP messaged that patient is overdue for Labs. Last office visit: 1/20/2020 Olvin Nguyen MD Last Physical: Visit date not found Last MTM visit: Visit date not found Last visit same specialty: 1/20/2020 Olvin Nguyen MD.  Next visit within 3 mo: Visit date not found  Next physical within 3 mo: Visit date not found      Lata Griffith, [facility] Triage/Med Refill 11/7/2020    Requested Prescriptions   Pending Prescriptions Disp Refills     blood glucose test (ACCU-CHEK SMARTVIEW TEST STRIP) strips 100 strip 0     Sig: USE TO TEST BLOOD SUGARS TWICE DAILY OR AS DIRECTED       Diabetic Supplies Refill Protocol Failed - 11/7/2020 10:09 AM        Failed - Visit with PCP or prescribing provider visit in last 6 months     Last office visit with prescriber/PCP: 1/20/2020 Olvin Nguyen MD OR same dept: 1/20/2020 Olvin Nguyen MD OR same specialty: 1/20/2020 Olvin Nguyen MD  Last physical: Visit date not found Last MTM visit: Visit date not found   Next visit within 3 mo: Visit date not found  Next physical within 3 mo: Visit date not found  Prescriber OR PCP: Olvin Nguyen MD  Last diagnosis associated with med order: 1. DM2 (diabetes mellitus, type 2) (H)  - blood glucose test (ACCU-CHEK SMARTVIEW TEST STRIP) strips; USE TO TEST BLOOD SUGARS TWICE DAILY OR AS DIRECTED  Dispense: 100 strip; Refill: 0    If protocol passes may refill for 12 months if within 3 months of last provider visit (or a total of 15 months).             Failed - A1C in last 6 months     Hemoglobin A1c   Date Value Ref Range Status   01/20/2020 6.8 (H) 3.5 - 6.0 % Final

## 2021-06-12 NOTE — PROGRESS NOTES
Assessment:      Healthy female exam.      Plan:      Diagnoses and all orders for this visit:    Normal routine physical examination  -     Gynecologic Cytology (PAP Smear)    DM2 (diabetes mellitus, type 2)  Given her difficulty remembering meds, Trulicity might be a good option for her.  I also want her in w/ DM education.  -     Glycosylated Hemoglobin A1c  -     Lipid Cascade  -     Ambulatory referral to Diabetes Education (Existing Diagnosis)    HLD (hyperlipidemia)  -     Comprehensive Metabolic Panel  -     Lipid Cascade    Microalbuminuria  -     Microalbumin, Random Urine    Screening mammogram, encounter for  -     Mammo Screening Bilateral; Future; Expected date: 8/1/17    Recurrent cold sores  -     valACYclovir (VALTREX) 500 MG tablet; Take 1 tablet (500 mg total) by mouth 2 (two) times a day for 3 days.  Dispense: 6 tablet; Refill: 3      The following high BMI interventions were performed this visit: encouragement to exercise and weight monitoring    Patient Instructions   Please set up your mammogram:  [phone]    Today's labs will be available on "[employer].  If you aren't signed up, then we'll mail them out.  If anything needs more immediate follow up, we'll also call.          Subjective:      Helen Chavez is a 62 y.o. female who presents for an annual exam.  She's generally doing well.      She is still struggling w/ her DM management.  She's forgetting her glipizide.  She's working on eating well.  She knows she needs to be better w/ exercise.  She's had an 11 lb pound weight loss in the last year.  Her sugars are running 154-263 fasting.  She denies any recent hypos.  She's taking 1/2 of her crestor tablet; at a full dose she had aches.    SHe is sweating a lot.  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  She's not SOB w/o and has no associated CP.  It's not always exertional, but can occur sometimes w/ activity.      Colonoscopy: 8/4/15  Last Dexa: N/A  Last mammogram:  2015    Gynecologic History  No LMP recorded. Patient is postmenopausal.  Contraception: post menopausal status  Last Pap: 8/4/15    Current Outpatient Prescriptions   Medication Sig Dispense Refill     ACCU-CHEK SMARTVIEW TEST STRIP strips Use twice a day or as directed 100 each 3     aspirin 81 MG EC tablet Take 81 mg by mouth daily.       glipiZIDE (GLUCOTROL) 5 MG 24 hr tablet Take 1 tablet (5 mg total) by mouth daily with breakfast. 90 tablet 3     ibuprofen (ADVIL) 200 MG tablet Take 400 mg by mouth every 6 (six) hours as needed for pain.       lancets 25 gauge Misc Use 1 each As Directed 2 (two) times a day.       lisinopril (PRINIVIL,ZESTRIL) 2.5 MG tablet Take 1 tablet by mouth daily 90 tablet 1     metFORMIN (GLUCOPHAGE XR) 500 MG 24 hr tablet Take 1 tablet (500 mg total) by mouth daily with supper. 90 tablet 1     rosuvastatin (CRESTOR) 20 MG tablet Take 0.5 tablets (10 mg total) by mouth bedtime. 45 tablet 1     UNABLE TO FIND Med Name: KeyMe A-Z vitamin for eye       valACYclovir (VALTREX) 500 MG tablet Take 1 tablet (500 mg total) by mouth 2 (two) times a day for 3 days. 6 tablet 3     No current facility-administered medications for this visit.      Past Medical History:   Diagnosis Date     Diabetes mellitus      Elevated LFTs      History of anesthesia complications     wild after surgery age 15     Hyperlipidemia      Hypertension      Menopause      Microalbuminuria      Warts of foot     right     Past Surgical History:   Procedure Laterality Date     BREAST BIOPSY Right 1970    benign lump removed     GANGLION CYST EXCISION       KNEE CARTILAGE SURGERY Left      PILONIDAL CYST / SINUS EXCISION       Penicillins  Family History   Problem Relation Age of Onset     Breast cancer Paternal Aunt 77     Social History     Social History     Marital status:      Spouse name: N/A     Number of children: N/A     Years of education: N/A     Occupational History     Not on file.     Social History  "Main Topics     Smoking status: Never Smoker     Smokeless tobacco: Never Used     Alcohol use Yes      Comment: 1 x mo     Drug use: No     Sexual activity: Not on file     Other Topics Concern     Not on file     Social History Narrative       Review of Systems  General:  no concerns  Eyes: seeing her eye dr for a right eye stye, early cataracts, and recnetly needing glasses  Ears/Nose/Throat: no concerns  Cardiovascular:no concerns  Respiratory:  no concerns  Gastrointestinal:  no concerns, Genitourinary: no concerns  Musculoskeletal:  no concerns  Skin: cold sore on her upper lip after visiting [facility]  Neurologic: no concerns  Psychiatric: no concerns  Endocrine: no concerns  Heme/Lymphatic: no concerns   Allergic/Immunologic: no concerns      Objective:         Vitals:    08/01/17 0837   BP: 120/76   Pulse: 74   Weight: (!) 225 lb 4 oz (102.2 kg)   Height: 5' 6.5\" (1.689 m)       Vitals:  Vitals:    08/01/17 0837   BP: 120/76   Patient Site: Right Arm   Pulse: 74   Weight: (!) 225 lb 4 oz (102.2 kg)   Height: 5' 6.5\" (1.689 m)     Wt Readings from Last 3 Encounters:   08/01/17 (!) 225 lb 4 oz (102.2 kg)   02/23/17 (!) 230 lb 6 oz (104.5 kg)   02/04/17 (!) 236 lb 11.2 oz (107.4 kg)     Body mass index is 35.81 kg/(m^2).    Physical Exam:  General Appearance: pleasant adult female, awake, alert, no acute distress  HEENT: pupils are equal, round and reactive to light, TMs normal, oropharynx clear  Neck: supple, no thyromegaly, no carotid bruits  Heart: rrr, no m/r/g  Lungs: Clear to auscultation bilaterally  Breast exam:  Normal skin overlying her breasts bilaterally no discrete nodule is palpable in the axilla bilaterally  Abdomen: s/nt/nd, no hsm  Pelvic:Normally developed genitalia with no external lesions or eruptions. Vagina and cervix show no lesions, inflammation, discharge or tenderness. No cystocele, No rectocele. Uterus wnl.  No adnexal mass or tenderness.  Extremities: no edema or lesions  Skin: Skin " color, texture, turgor normal, no rashes or lesions  Neurologic: Normal

## 2021-06-12 NOTE — TELEPHONE ENCOUNTER
Refill Approved    Rx renewed per Medication Renewal Policy. Medication was last renewed on 10/24/19.    Leslie Orozco, [facility] Triage/Med Refill 11/4/2020     Requested Prescriptions   Pending Prescriptions Disp Refills     rosuvastatin (CRESTOR) 10 MG tablet [Pharmacy Med Name: ROSUVASTATIN CALCIUM 10MG TABS] 90 tablet 3     Sig: TAKE ONE TABLET BY MOUTH AT BEDTIME       Statins Refill Protocol (Hmg CoA Reductase Inhibitors) Passed - 11/1/2020  8:23 AM        Passed - PCP or prescribing provider visit in past 12 months      Last office visit with prescriber/PCP: 1/20/2020 Olvin Nguyen MD OR same dept: 1/20/2020 Olvin Nguyen MD OR same specialty: 1/20/2020 Olvin Nguyen MD  Last physical: Visit date not found Last MTM visit: Visit date not found   Next visit within 3 mo: Visit date not found  Next physical within 3 mo: Visit date not found  Prescriber OR PCP: Olvin Nguyen MD  Last diagnosis associated with med order: 1. Hyperlipidemia  - rosuvastatin (CRESTOR) 10 MG tablet [Pharmacy Med Name: ROSUVASTATIN CALCIUM 10MG TABS]; TAKE ONE TABLET BY MOUTH AT BEDTIME  Dispense: 90 tablet; Refill: 3    If protocol passes may refill for 12 months if within 3 months of last provider visit (or a total of 15 months).

## 2021-06-12 NOTE — TELEPHONE ENCOUNTER
Medication Question or Clarification  Who is calling: Pharmacy   What medication are you calling about (include dose and sig)?: OZEMPIC 1 mg/dose (2 mg/1.5 mL) PnIj 3 mL 3 7/29/2020    Sig: INJECT 1MG UNDER THE SKIN ONCE A WEEK   Sent to pharmacy as: Ozempic 1 mg/dose (2 mg/1.5 mL) subcutaneous pen injector (semaglutide)   Who prescribed the medication?: Olvin Nguyen MD  What is your question/concern?: Pharmacy states patients insurance allows for 90 days supply for the medication Ozempic requesting to send 90 days supply .   Requested Pharmacy: Marty Harmonay to leave a detailed message?: No

## 2021-06-12 NOTE — PROGRESS NOTES
Assessment: Helen is here alone today to discuss getting her glucose back in better control.    She is taking Glipizide ER 5mg before breakfast every day, she is good about this.  She also takes Metformin 500mg BID.  She is good about getting her morning dose in, not so much with evening dose.  Stated if she goes out she forgets.  IF she has people over she forgets.  Sometimes she just forgets.  We did discuss GLP-1, Trulicity specifically, how the medication works to control glucose, how it is taken, possible side effects and cost.  She is uncertain about starting an injectable at this time and would like more time to work on her lifestyle before starting another medication.    Helen has had some significant social stressors in her life over the past year.  Her uncle, sister and an aunt she cared for all passed away.  She also got a new job.  We discuss the impact this has had on her taking her medications regularly, getting her activity in and just stress in general.  We discussed she needs to make time for herself, she needs to take care of herself so she can take care of others.  She understands this but feels it will be hard.        Plan: We decide to start somewhere relatively small: Check FBG daily, take medications daily and get 10-15 minutes of activity in daily.  She will come back in 6 weeks to follow-up.  If her readings have improved, great!  If not, we will revisit adding another agent.    Subjective and Objective:      Helen Chavez is referred by Dr. Clover Felix for Diabetes Education.     Lab Results   Component Value Date    HGBA1C 9.2 (H) 08/01/2017         Current diabetes medications:  Glipzide ER 5mg daily, Metformin 500mg BID    Goals       HEMOGLOBIN A1C < 8.0            Goals for 8.17.17:  Medication-  Work on taking Metformin twice daily every day  Monitoring-  Check glucose fasting and before dinner every night  Nutrition-  Get your focus back to yourself and your portion  sizes  Other-  Engage in physical activity for at least 10 minutes every day            Follow up:   CDE (certified diabetic educator)      Education:     Monitoring   Meter (per above goals): Assessed and Discussed  Monitoring: Assessed and Discussed  BG goals: Assessed and Discussed    Nutrition Management  Nutrition Management: Assessed and Discussed  Weight: Assessed  Portions/Balance: Assessed, Discussed and Literature provided  Carb ID/Count: Assessed, Discussed and Patient returned demonstration  Label Reading: Assessed and Discussed  Heart Healthy Fats: Assessed, Discussed and Literature provided  Menu Planning: Assessed, Discussed and Literature provided  Dining Out: Assessed and Discussed  Physical Activity: Assessed and Discussed  Medications: Assessed and Discussed  Orals: Assessed and Discussed  Injected Medications: Discussed   Storage/Exp:Not addressed   Site Rotation: Not addressed   Sites Assessed: no    Diabetes Disease Process: Assessed and Discussed    Acute Complications: Prevent, Detect, Treat:  Hypoglycemia: Assessed and Discussed  Hyperglycemia: Assessed and Discussed  Sick Days: Needs instruction/review at follow-up and Not addressed  Driving: Needs instruction/review at follow-up and Not addressed    Chronic Complications  Foot Care:Needs instruction/review at follow-up and Not addressed  Skin Care: Needs instruction/review at follow-up and Not addressed  Eye: Needs instruction/review at follow-up and Not addressed  ABC: Needs instruction/review at follow-up and Not addressed  Teeth:Needs instruction/review at follow-up and Not addressed  Goal Setting and Problem Solving: Assessed and Discussed  Barriers: Assessed and Discussed  Psychosocial Adjustments: Assessed and Discussed      Time spent with the patient: 60 minutes for diabetes education and counseling.   Previous Education: yes  Visit Type:DSMT  Hours Remaining: DSMT 1 and MNT 2      Brenda Paris  8/22/2017

## 2021-06-13 NOTE — PROGRESS NOTES
ASSESSMENT and PLAN:  1. Type 2 diabetes mellitus without complication, without long-term current use of insulin  Her sensor data looks very encouraging.  To help control the spikes in the morning, we discussed increasing her glipizide to 10 mg daily with breakfast.  She will monitor for hypoglycemia.  We will see her back in about 3 months.  Prior to our follow-up, she will review her blood sugars with diabetes education.  - glipiZIDE (GLUCOTROL XL) 10 MG 24 hr tablet; Take 1 tablet by mouth daily with breakfast  Dispense: 90 tablet; Refill: 3  - Glycosylated Hemoglobin A1c    2. Hyperlipidemia  Stable on Crestor.  - rosuvastatin (CRESTOR) 10 MG tablet; Take 1 tablet (10 mg total) by mouth at bedtime  Dispense: 90 tablet; Refill: 3    3. Elevated liver enzymes  With her lab work today, we will repeat liver function tests and determine if any additional evaluation needs to be pursued.  - Hepatic Profile      Medications Discontinued During This Encounter   Medication Reason     glipiZIDE (GLUCOTROL XL) 5 MG 24 hr tablet Reorder     rosuvastatin (CRESTOR) 20 MG tablet Reorder       No Follow-up on file.    There are no Patient Instructions on file for this visit.    CHIEF COMPLAINT:  Chief Complaint   Patient presents with     Follow-up     Blood sugars       HISTORY OF PRESENT ILLNESS:  Helen Chavez is a 62 y.o. female  presenting to the clinic today for follow-up of her type 2 diabetes.  She met with Velia, with diabetes education prior to our visit.  We had reviewed her sensor monitor.  Her blood sugars were in an excellent range with the exception of outliers in the morning after breakfast.  We discussed treatment options today.  Her blood sugar graft looks much better than her last A1c of 9.2 would suggest.  She thinks that her travel and poor diet choices over the summer accounted for the elevated A1c and now things are back in a more reasonable range.    At her last visit, her liver function tests  were mildly elevated.  We had planned to recheck those today.    REVIEW OF SYSTEMS:   General: We reviewed her weight and she has lost approximately 20 pounds this year   all other systems are negative.    Family History   Problem Relation Age of Onset     Breast cancer Paternal Aunt 77       TOBACCO USE:  History   Smoking Status     Never Smoker   Smokeless Tobacco     Never Used       VITALS:  Vitals:    11/01/17 1422   BP: 110/68   Patient Site: Right Arm   Pulse: 64   Weight: 219 lb 7 oz (99.5 kg)     Wt Readings from Last 3 Encounters:   11/01/17 219 lb 7 oz (99.5 kg)   09/27/17 220 lb 8 oz (100 kg)   08/01/17 (!) 225 lb 4 oz (102.2 kg)         PHYSICAL EXAM:  Constitutional:  Reveals an alert, pleasant adult female.   Vitals:  Noted.   Psych: Upbeat, appropriate    QUALITY MEASURES:  The following high BMI interventions were performed this visit: encouragement to exercise and weight monitoring    MEDICATIONS:  Current Outpatient Prescriptions   Medication Sig Dispense Refill     ACCU-CHEK SMARTVIEW TEST STRIP strips Use twice a day or as directed 100 each 3     aspirin 81 MG EC tablet Take 81 mg by mouth daily.       garlic 100 mg Tab Take 1 tablet by mouth daily.       glipiZIDE (GLUCOTROL XL) 10 MG 24 hr tablet Take 1 tablet by mouth daily with breakfast 90 tablet 3     ibuprofen (ADVIL) 200 MG tablet Take 400 mg by mouth every 6 (six) hours as needed for pain.       lancets 25 gauge Misc Use 1 each As Directed 2 (two) times a day.       lisinopril (PRINIVIL,ZESTRIL) 2.5 MG tablet Take 1 tablet by mouth daily 90 tablet 3     metFORMIN (GLUCOPHAGE XR) 500 MG 24 hr tablet Take 1 tablet (500 mg total) by mouth daily with supper. 90 tablet 1     rosuvastatin (CRESTOR) 10 MG tablet Take 1 tablet (10 mg total) by mouth at bedtime 90 tablet 3     UNABLE TO FIND Med Name: Lumeg A-Z vitamin for eye       No current facility-administered medications for this visit.

## 2021-06-13 NOTE — PROGRESS NOTES
Time spent with the patient: 30 minutes for diabetes education and counseling.   Visit Type:DSMT     Helen Chavez is referred by Dr. Clover Felix for Diabetes Education.     Accompanied by: unaccompanied    Average:134 with A1C of 6.3%    Target range:    Discussion:  I reviewed Myrna's reports with her after discussing them with Dr. Felix.  Her readings start to trend up in the morning about 5am, with her median readings crossing above 140 at that time.  Her readings do come back down into range about noon.  From this time through about 5am the following morning her glucose remains in target range with some small deviations from meal planning.  She did not have any incidents of symptomatic hypoglycemia during her study.  She did have a few readings below 80 the first and last day of the sensor study.  We reviewed her meal planning and she is doing quite well with lunch and dinner.  Her breakfast is a little carb heavy, she has berries with yogurt, banana, whole wheat toast or about 1 cup of sweet potato.  We discussed aiming for 1 or two of these choices for breakfast as this seems to be further elevating her already higher glucose in the morning.  She agreed to work on this.  We discussed different treatment options including Trulicty, Farxiga, Januvia and increasing her Glipizide dose.  She opted for the later, agreeing to check glucose and follow-up in about 4-6 weeks with me to see how the change is working for her.  All additional questions and concerns addressed.    Current Diabetes medications:  Glipizide 5mg in AM, Metformin 500mg in PM    Suggested medication changes:  Increase Glipzide to 10mg in AM    Follow-up:  4-6 weeks with me, no appointment made today.            Monitoring   Meter (per above goals): Assessed and Discussed  Monitoring: Assessed and Discussed  BG goals: Assessed and Discussed    Nutrition Management  Nutrition Management: Assessed and Discussed  Weight: Assessed and  Discussed  Portions/Balance: Assessed and Discussed  Carb ID/Count: Assessed and Discussed  Label Reading: Assessed and Competent  Heart Healthy Fats: Assessed and Discussed  Menu Planning: Assessed and Discussed  Dining Out: Assessed and Discussed  Physical Activity: Assessed and Discussed  Medications: Assessed and Discussed    Diabetes Disease Process: Assessed and Discussed    Acute Complications: Prevent, Detect, Treat:  Hypoglycemia: Assessed and Discussed  Hyperglycemia: Assessed and Discussed  Sick Days: Needs instruction/review at follow-up and Not addressed  Driving: Needs instruction/review at follow-up and Not addressed    Chronic Complications  Foot Care:Needs instruction/review at follow-up and Not addressed  Skin Care: Needs instruction/review at follow-up and Not addressed  Eye: Needs instruction/review at follow-up and Not addressed  ABC: Needs instruction/review at follow-up and Not addressed  Teeth:Needs instruction/review at follow-up and Not addressed  Goal Setting and Problem Solving: Assessed and Discussed  Barriers: Assessed and Discussed  Psychosocial Adjustments: Assessed and Discussed      Brenda Paris  11/7/2017  7:06 AM

## 2021-06-13 NOTE — PROGRESS NOTES
Assessment: Myrna is here for follow-up.    She has been working on taking her medications more regularly.  Stated it is difficult remembering to take her meds with her when she goes out.  She has been taking them with her when she goes to work in the evenings.  She still is missing evening meds nights she goes out to eat on the spur of the minute.    She is checking her blood sugar fasting and before dinner.  Her fasting readings have been running 163-174.  She is having lower blood sugars in the afternoon.  She had a reading of 88 one afternoon, stated she felt nauseated and really hungry.  Stated this seems to be happening more often in the late afternoon.      Plan: The variation in her glucose is bothersome.  She agreed to com back tomorrow for a sensor placement.    Subjective and Objective:      Helen Chavez is referred by Dr. Clover Felix for Diabetes Education.     Lab Results   Component Value Date    HGBA1C 9.2 (H) 08/01/2017         Current diabetes medications:  Glipizide XL 5mg with breakfast and Metformin XR 500mg with dinner.    Goals       HEMOGLOBIN A1C < 8.0            Goals for 9.27.17:  Return for sensor placement    Goals for 8.17.17 assessed 9.27.17:  Medication-  Work on taking Metformin twice daily every day-Unmet  Monitoring-  Check glucose fasting and before dinner every night-Met  Nutrition-  Get your focus back to yourself and your portion sizes-Met  Other-  Engage in physical activity for at least 10 minutes every day-Met            Follow up:   CDE (certified diabetic educator)      Education:     Monitoring   Meter (per above goals): Assessed and Discussed  Monitoring: Assessed and Discussed  BG goals: Assessed and Discussed    Nutrition Management  Nutrition Management: Assessed and Discussed  Weight: Assessed and Discussed  Portions/Balance: Assessed and Discussed  Carb ID/Count: Assessed and Discussed  Label Reading: Not addressed  Heart Healthy Fats: Assessed and  Discussed  Menu Planning: Assessed and Discussed  Dining Out: Assessed and Discussed  Physical Activity: Assessed and Discussed  Medications: Assessed and Discussed  Orals: Assessed and Discussed  Injected Medications: Not addressed   Storage/Exp:Not addressed   Site Rotation: Not addressed   Sites Assessed: no    Diabetes Disease Process: Assessed and Discussed    Acute Complications: Prevent, Detect, Treat:  Hypoglycemia: Assessed and Discussed  Hyperglycemia: Assessed and Discussed  Sick Days: Not addressed  Driving: Not addressed    Chronic Complications  Foot Care:Not addressed  Skin Care: Not addressed  Eye: Not addressed  ABC: Not addressed  Teeth:Not addressed  Goal Setting and Problem Solving: Assessed and Discussed  Barriers: Assessed and Discussed  Psychosocial Adjustments: Assessed and Discussed      Time spent with the patient: 60 minutes for diabetes education and counseling.   Previous Education: yes  Visit Type:LAURA Paris  10/10/2017

## 2021-06-13 NOTE — TELEPHONE ENCOUNTER
Pt calling  Sx started last night suddenly   Pain in ankle radiating up calf & in thigh  R leg   No swelling  No red miller or warm/hot spots  Pain rated 10 at its worse improved with Tylenol but as soon as it wears off pain shoots back up to a 10   When she stands, it seems to improve pain  Per protocol pt to be evaluated in ED or [facility]  Pt will go to U[facility] room  Reviewed care advice & when to call back  Pr agree's with plan  Leslie Richardson RN  [facility] Nurse Advisor      Additional Information    Negative: Looks like a broken bone or dislocated joint (e.g., crooked or deformed)    Negative: Sounds like a life-threatening emergency to the triager    Negative: Followed a hip injury    Negative: Followed a knee injury    Negative: Followed an ankle or foot injury    Negative: Back pain radiating (shooting) into leg(s)    Negative: Foot pain is the main symptom    Negative: Ankle pain is the main symptom    Negative: Knee pain is the main symptom    Negative: Leg swelling is the main symptom    Negative: Chest pain    Negative: Difficulty breathing    Negative: Entire foot is cool or blue in comparison to other side    Negative: Unable to walk    Thigh or calf pain in only one leg and present > 1 hour    Negative: Fever and swollen joint    Negative: Fever and red area (or area very tender to touch)    Protocols used: LEG PAIN-A-OH    COVID 19 Nurse Triage Plan/Patient Instructions    Please be aware that novel coronavirus (COVID-19) may be circulating in the community. If you develop symptoms such as fever, cough, or SOB or if you have concerns about the presence of another infection including coronavirus (COVID-19), please contact your health care provider or visit [email or web address].     Disposition/Instructions    ED Visit recommended. Follow protocol based instructions.      Bring Your Own Device:  Please also bring your smart device(s) (smart phones, tablets, laptops) and their charging cables for your personal use and  to communicate with your care team during your visit.      Thank you for taking steps to prevent the spread of this virus.  o Limit your contact with others.  o Wear a simple mask to cover your cough.  o Wash your hands well and often.    Resources    [facility]: About COVID-19: [email or web address]    CDC: What to Do If You're Sick: www.cdc.gov/coronavirus/2019-ncov/about/steps-when-sick.html    CDC: Ending Home Isolation: www.cdc.gov/coronavirus/2019-ncov/hcp/disposition-in-home-patients.html     CDC: Caring for Someone: www.cdc.gov/coronavirus/2019-ncov/if-you-are-sick/care-for-someone.html     [facility]: Interim Guidance for Hospital Discharge to Home: www.health.[facility].mn./diseases/coronavirus/hcp/hospdischarge.pdf    [facility] clinical trials (COVID-19 research studies): [email or web address]-[email or web address]-trials     Below are the COVID-19 hotlines at the [place] Department of [facility] ([facility]). Interpreters are available.   o For health questions: Call 151-879-9235 or 1-216.296.3519 (7 a.m. to 7 p.m.)  o For questions about schools and childcare: Call 010-487-5702 or 1-854.495.9299 (7 a.m. to 7 p.m.)

## 2021-06-14 NOTE — TELEPHONE ENCOUNTER
RN cannot approve Refill Request    RN can NOT refill this medication PCP messaged that patient is overdue for Office Visit. Last office visit: 1/20/2020 Olvin Nguyen MD Last Physical: Visit date not found Last MTM visit: Visit date not found Last visit same specialty: 1/20/2020 Olvin Nguyen MD.  Next visit within 3 mo: Visit date not found  Next physical within 3 mo: Visit date not found      Lata Griffith, [facility] Triage/Med Refill 1/31/2021    Requested Prescriptions   Pending Prescriptions Disp Refills     rosuvastatin (CRESTOR) 10 MG tablet [Pharmacy Med Name: ROSUVASTATIN CALCIUM 10MG TABS] 90 tablet 0     Sig: TAKE ONE TABLET BY MOUTH AT BEDTIME       Statins Refill Protocol (Hmg CoA Reductase Inhibitors) Failed - 1/31/2021 12:42 PM        Failed - PCP or prescribing provider visit in past 12 months      Last office visit with prescriber/PCP: 1/20/2020 Olvin Nguyen MD OR same dept: Visit date not found OR same specialty: 1/20/2020 Olvin Nguyen MD  Last physical: Visit date not found Last MTM visit: Visit date not found   Next visit within 3 mo: Visit date not found  Next physical within 3 mo: Visit date not found  Prescriber OR PCP: Olvin Nguyen MD  Last diagnosis associated with med order: 1. Hyperlipidemia  - rosuvastatin (CRESTOR) 10 MG tablet [Pharmacy Med Name: ROSUVASTATIN CALCIUM 10MG TABS]; TAKE ONE TABLET BY MOUTH AT BEDTIME  Dispense: 90 tablet; Refill: 0    2. Microalbuminuria  - lisinopriL (PRINIVIL,ZESTRIL) 2.5 MG tablet [Pharmacy Med Name: LISINOPRIL 2.5MG TABS]; TAKE ONE TABLET BY MOUTH EVERY DAY  Dispense: 90 tablet; Refill: 1    If protocol passes may refill for 12 months if within 3 months of last provider visit (or a total of 15 months).                lisinopriL (PRINIVIL,ZESTRIL) 2.5 MG tablet [Pharmacy Med Name: LISINOPRIL 2.5MG TABS] 90 tablet 1     Sig: TAKE ONE TABLET BY MOUTH EVERY DAY       Ace Inhibitors Refill Protocol Failed - 1/31/2021 12:42 PM         Failed - PCP or prescribing provider visit in past 12 months       Last office visit with prescriber/PCP: 1/20/2020 Olvin Nguyen MD OR same dept: Visit date not found OR same specialty: 1/20/2020 Olvin Nguyen MD  Last physical: Visit date not found Last MTM visit: Visit date not found   Next visit within 3 mo: Visit date not found  Next physical within 3 mo: Visit date not found  Prescriber OR PCP: Olvin Nguyen MD  Last diagnosis associated with med order: 1. Hyperlipidemia  - rosuvastatin (CRESTOR) 10 MG tablet [Pharmacy Med Name: ROSUVASTATIN CALCIUM 10MG TABS]; TAKE ONE TABLET BY MOUTH AT BEDTIME  Dispense: 90 tablet; Refill: 0    2. Microalbuminuria  - lisinopriL (PRINIVIL,ZESTRIL) 2.5 MG tablet [Pharmacy Med Name: LISINOPRIL 2.5MG TABS]; TAKE ONE TABLET BY MOUTH EVERY DAY  Dispense: 90 tablet; Refill: 1    If protocol passes may refill for 12 months if within 3 months of last provider visit (or a total of 15 months).             Failed - Serum Potassium in past 12 months     No results found for: LN-POTASSIUM          Failed - Blood pressure filed in past 12 months     BP Readings from Last 1 Encounters:   01/20/20 128/78             Failed - Serum Creatinine in past 12 months     Creatinine   Date Value Ref Range Status   01/20/2020 0.65 0.60 - 1.10 mg/dL Final

## 2021-06-15 NOTE — PROGRESS NOTES
Assessment:     1. Flank pain  Urinalysis-UC if Indicated   2. Dysuria  phenazopyridine (PYRIDIUM) 95 MG tablet     Results for orders placed or performed in visit on 01/08/18   Urinalysis-UC if Indicated   Result Value Ref Range    Color, UA Yellow Colorless, Yellow, Straw, Light Yellow    Clarity, UA Clear Clear    Glucose,  mg/dL (!) Negative    Bilirubin, UA Negative Negative    Ketones, UA Negative Negative    Specific Gravity, UA 1.025 1.005 - 1.030    Blood, UA Negative Negative    pH, UA 5.5 5.0 - 8.0    Protein, UA Negative Negative mg/dL    Urobilinogen, UA 0.2 E.U./dL 0.2 E.U./dL, 1.0 E.U./dL    Nitrite, UA Negative Negative    Leukocytes, UA Negative Negative        Plan:   -Discussed results with the patient, negative for UTI.  Will do a urine culture and contact patient if she needs treatment.  Advised patient to increase fluid intake, may use over-the-counter Azo for dysuria.      Subjective:       62 y.o. female presents for evaluation flank pain.  Patient reports that he has had similar symptoms previously where he ended up being diagnosed with a UTI.  She feels like the pain is the same, the pain started yesterday, severe at times but right now is just aching.  On a scale of 0-10 she feels like the pain is about 6/10.  She reports some frequency and urgency but denies dysuria.  She reports nausea, diarrhea, denies shortness of breath, fever.    The following portions of the patient's history were reviewed and updated as appropriate: allergies, current medications, past family history, past medical history, past social history, past surgical history and problem list.    Review of Systems  A 12 point comprehensive review of systems was negative except as noted.     Objective:      /66 (Patient Site: Right Arm, Patient Position: Sitting, Cuff Size: Adult Large)  Pulse 76  Temp 98.6  F (37  C) (Oral)   Resp 18  SpO2 95%  Breastfeeding? No  General appearance: alert, appears stated age,  cooperative and no distress  Back: left flank pain  Heart: regular rate and rhythm, S1, S2 normal, no murmur, click, rub or gallop  Pelvic: cervix normal in appearance, external genitalia normal, no adnexal masses or tenderness, no cervical motion tenderness, rectovaginal septum normal, uterus normal size, shape, and consistency and vagina normal without discharge  Pulses: 2+ and symmetric  Lymph nodes: Cervical, supraclavicular, and axillary nodes normal.  Neurologic: Grossly normal     This note has been dictated using voice recognition software. Any grammatical or context distortions are unintentional and inherent to the software

## 2021-06-17 NOTE — PROGRESS NOTES
Assessment & Plan   Problem List Items Addressed This Visit     HLD (hyperlipidemia)    DM2 (diabetes mellitus, type 2) (H)    Relevant Orders    Microalbumin, Random Urine    Glycosylated Hemoglobin A1c    Lipid Cascade    Comprehensive Metabolic Panel      Other Visit Diagnoses     Screen for colon cancer    -  Primary    Screening for colon cancer        Relevant Orders    Ambulatory referral for Colonoscopy - LAURO MULLIGAN Alycia         #1.  Type 2 diabetes, degree of control be determined.  Check a hemoglobin A1c, lipid profile, CMP, and urine microalbumin.  I will call her with results and further recommendations.  Follow-up should be in 6 months.  She is also due for a complete physical exam with her primary care physician.    2.  Hyperlipidemia.  Lipids as above.    3.  Healthcare maintenance.  Order for her colonoscopy was placed as she is overdue.          No follow-ups on file.    Aryan Felix MD  [facility]      Subjective   Helen Chavez is 66 y.o. and presents today for the following health issues     Helen is a 65 y/o female patient of Dr. Nguyen who comes in today for follow up of her chronic medical conditions.  Not sure why she was scheduled with me rather than her PCP, but happy to help her out.  She has a history of type 2 diabetes, currently on extended release metformin at 500 mg/day along with Ozempic 1mg weekly.  It has been over a year since she has been seen in clinic.  Last hemoglobin A1c was 6.8 in January 2020.  She is due for all of her diabetic labs today.    Hyperlipidemia, currently on 10 mg of rosuvastatin.  She is tolerating this well.  Due for lipids today.    She is overdue for her colonoscopy.  She was concerned about going in during the Covid pandemi but is okay with going in now.  An order was placed today.c         Objective    /72   Pulse 68   Wt 202 lb (91.6 kg)   BMI 32.12 kg/m    Body mass index is 32.12  kg/m .  Physical Exam

## 2021-06-17 NOTE — PROGRESS NOTES
"ASSESSMENT and PLAN:  1. Type 2 diabetes mellitus without complication, without long-term current use of insulin  Her A1c came back elevated.  I'm going to have her increase her metformin to 1000 mg daily.  She'll continue glipizide.  We'll plan on a 3 month follow up.  - Glycosylated Hemoglobin A1c  - Comprehensive Metabolic Panel  - metFORMIN (GLUCOPHAGE-XR) 500 MG 24 hr tablet; Take 2 tablets (1,000 mg total) by mouth daily with supper.  Dispense: 180 tablet; Refill: 1    2. HLD (hyperlipidemia)  Stable on Crestor.  We don't need to recheck her cholesterol today.  We'll get LFTs w/ her cmp.    3. Obesity  She's encouraged to continue working on diet and add in more exercise.        Medications Discontinued During This Encounter   Medication Reason     aspirin 81 MG EC tablet Reorder     metFORMIN (GLUCOPHAGE-XR) 500 MG 24 hr tablet Reorder       Return in about 3 months (around 8/3/2018).    Patient Instructions   Today's labs will be available on [employer].  If you aren't signed up, then we'll mail them out.  If anything needs more immediate follow up, we'll also call.    Keep up the good work!      CHIEF COMPLAINT:  Chief Complaint   Patient presents with     Diabetes       HISTORY OF PRESENT ILLNESS:  Helen Chavez is a 63 y.o. female  presenting to the clinic today for follow up of her DM2 and hyperlipidemia.      She's been working on her weight.  She met w/ a  a few years ago.  She feels she has the eating aspect down and now needs to work on exercise.  She has a DVD Beach Body workout.  She also has a walking and dancing exercise routine that she does.     For her DM, her morning blood sugars are still \"odd.\"  They average in the 160s-170s.  She eats breakfast at 7AM.  She feels hungry at 11AM; and will have a small snack.  Her pre-dinner sugars are in the 90s to 110.  She denies any hypoglycemia.  She takes her metformin at night and that's working well for her; she's only on 500mg daily.  " She's also on 5mg daily of glipizide xl.      For her cholesterol, she's on crestor 10 mg daily.  In August of 2017, her LDL was 86.      She's been struggling w/ a stye in her right eye for about one year.  She has a f/u appt on Monday w/ her eye       REVIEW OF SYSTEMS:   MSK:  She has aching in her left ankle; it's worse when she's on her feet for longer lengths of time All other systems are negative.    PFSH:  Family History   Problem Relation Age of Onset     Breast cancer Paternal Aunt 77     TOBACCO USE:  History   Smoking Status     Never Smoker   Smokeless Tobacco     Never Used       VITALS:  Vitals:    05/03/18 1452   BP: 118/62   Patient Site: Right Arm   Pulse: 64   Weight: 217 lb 8 oz (98.7 kg)     Wt Readings from Last 3 Encounters:   05/03/18 217 lb 8 oz (98.7 kg)   11/01/17 219 lb 7 oz (99.5 kg)   09/27/17 220 lb 8 oz (100 kg)     PHYSICAL EXAM:  Constitutional:  Reveals an alert, pleasant adult female.   Vitals:  Noted.   Lungs: Clear to auscultation bilaterally, respirations unlabored.   Heart: Regular rate and rhythm, S1 and S2 normal, no murmur, rub, or gallop,   Abdomen: Soft, non-tender  Extremities: Extremities normal, atraumatic, no lesions on her feet bilaterally    QUALITY MEASURES:  The following high BMI interventions were performed this visit: weight monitoring    MEDICATIONS:  Current Outpatient Prescriptions   Medication Sig Dispense Refill     ACCU-CHEK SMARTVIEW TEST STRIP strips Use twice a day or as directed 100 each 3     aspirin 81 MG EC tablet Take 1 tablet (81 mg total) by mouth daily.  0     garlic 100 mg Tab Take 1 tablet by mouth daily.       glipiZIDE (GLUCOTROL XL) 5 MG 24 hr tablet Take 1 tablet by mouth daily with breakfast 30 tablet 8     ibuprofen (ADVIL) 200 MG tablet Take 400 mg by mouth every 6 (six) hours as needed for pain.       lancets 25 gauge Misc Use 1 each As Directed 2 (two) times a day.       lisinopril (PRINIVIL,ZESTRIL) 2.5 MG tablet Take 1 tablet by  mouth daily 90 tablet 3     metFORMIN (GLUCOPHAGE-XR) 500 MG 24 hr tablet Take 2 tablets (1,000 mg total) by mouth daily with supper. 180 tablet 1     rosuvastatin (CRESTOR) 10 MG tablet Take 1 tablet (10 mg total) by mouth at bedtime 90 tablet 3     UNABLE TO FIND Med Name: Lumeg A-Z vitamin for eye       No current facility-administered medications for this visit.

## 2021-06-18 NOTE — LETTER
Letter by Glenn Wayne CNP at      Author: Glenn Wayne CNP Service: -- Author Type: --    Filed:  Encounter Date: 1/8/2019 Status: (Other)         [facility] INTERNAL MEDICINE  01/08/19    Patient: Helen Chavez  YOB: 1955  Medical Record Number: [id number]  CSN: [id number]                                                                              Non-opioid Controlled Substance Agreement    I understand that my care provider has prescribed a controlled substance to help manage my condition(s). I am taking this medicine to help me function or work. I know this is strong medicine, and that it can cause serious side effects. Controlled substances can be sedating, addicting and may cause a dependency on the drug. They can affect my ability to drive or think, and cause depression. They need to be taken exactly as prescribed. Combining controlled substances with certain medicines or chemicals (such as cocaine, sedatives and tranquilizers, sleeping pills, meth) can be dangerous or even fatal. Also, if I stop controlled substances suddenly, I may have severe withdrawal symptoms.  If not helpful, I may be asked to stop them.    The risks, benefits, and side effects of these medicine(s) were explained to me. I agree that:    1. I will take part in other treatments as advised by my care team. This may be psychiatry or counseling, physical therapy, behavioral therapy, group treatment or a referral to a pain clinic. I will reduce or stop my medicine when my care team tells me to do so.  2. I will take my medicines as prescribed. I will not change the dose or schedule unless my care team tells me to. There will be no refills if I run out early.  I may be contactedwithout warning and asked to complete a urine drug test or pill count at any time.   3. I will keep all my appointments, and understand this is part of the monitoring of controlled substances. My care team may require an  office visit for EVERY controlled substance refill. If I miss appointments or dont follow instructions, my care team may stop my medicine.  4. I will not ask other providers to prescribe controlled substances, and I will not accept controlled substances from other people. If I need another prescribed controlled substance for a new reason, I will tell my care team within 1 business day.  5. I will use one pharmacy to fill all of my controlled substance prescriptions, and it is up to me to make sure that I do not run out of my medicines on weekends or holidays. If my care team is willing to refill my controlled substance prescription without a visit, I must request refills only during office hours, refills may take up to 3 days to process, and it may take up to 5 to 7 days for my medicine to be mailed and ready at my pharmacy. Prescriptions will not be mailed anywhere except my pharmacy.    6. I am responsible for my prescriptions. If the medicine/prescription is lost or stolen, it will not be replaced. I also agree not to share controlled substance medicines with anyone.          [facility] INTERNAL MEDICINE  01/08/19  Patient:  Helen Chavez  YOB: 1955  Medical Record Number: [id number]  CSN: [id number]    7. I agree to not use ANY illegal or recreational drugs. This includes marijuana, cocaine, bath salts or other drugs. I agree not to use alcohol unless my care team says I may. I agree to give urine samples whenever asked. If I dont give a urine sample, the care team may stop my medicine.    8. If I enroll in the [place] Medical Marijuana program, I will tell my care team. I will also sign an agreement to share my medical records with my care team.    9. I will bring in my list of medicines (or my medicine bottles) each time I come to the clinic.   10. I will tell my care team right away if I become pregnant or have a new medical problem treated outside of my regular  clinic.  11. I understand that this medicine can affect my thinking and judgment. It may be unsafe for me to drive, use machinery and do dangerous tasks. I will not do any of these things until I know how the medicine affects me. If my dose changes, I will wait to see how it affects me. I will contact my care team if I have concerns about medicine side effects.    I understand that if I do not follow any of the conditions above, my prescriptions or treatment may be stopped.      I agree that my provider, clinic care team, and pharmacy may work with any city, state or federal law enforcement agency that investigates the misuse, sale, or other diversion of my controlled medicine. I will allow my provider to discuss my care with or share a copy of this agreement with any other treating provider, pharmacy or emergency room where I receive care. I agree to give up (waive) any right of privacy or confidentiality with respect to these consents.   I have read this agreement and have asked questions about anything I did not understand.    ___________________________________________________________________________  Patient signature - Date/Time  -Helen Chavez                                      ___________________________________________________________________________  Witness signature                                                                    ___________________________________________________________________________  Provider signature- Glenn Wayne CNP

## 2021-06-18 NOTE — PROGRESS NOTES
Assessment:   1. Fall due to wet surface, initial encounter  2. Injury of left ankle, initial encounter   Fracture of lateral malleolus    - XR Ankle Left 3 or More VWS; Future  FINDINGS: There are calcifications at the tip of the medial malleolus and the distal fibula but although they could represent avulsion fractures, they appear corticated and may be old rather than acute. The ankle otherwise appears unremarkable.     Plan:   Natural history and expected course discussed. Questions answered.  Rest, ice, compression, elevation (RICE) therapy.  Fit with ankle brace for use over next 4 weeks.  NSAIDs per medication orders.  Orthopedics referral.  Follow-up with PCP in 2 weeks.     Subjective:   Helen Chavez is a 63 y.o. female who presents with left ankle injury.  Onset of the symptoms was few hours ago while at work. A work injury. Inciting event: Patient reported that she was walking to a classroom and she stepped on a cupcake and she slide and fell.. Current symptoms include ability to bear weight, but with some pain, bruising and swelling.  Aggravating symptoms: any weight bearing and inactivity. Patient's course of pain: stable. Patient has had prior ankle problems. Previous visits for this problem: none.  Evaluation to date: none. Treatment to date: ice   .  The following portions of the patient's history were reviewed and updated as appropriate: allergies, current medications, past family history, past medical history, past social history, past surgical history and problem list.    Review of Systems  A 12 point comprehensive review of systems was negative except as noted.      Objective:      /88 (Patient Site: Right Arm, Patient Position: Sitting, Cuff Size: Adult Large)  Pulse 69  SpO2 94%  Right ankle:  normal   Left ankle:  2+ effusion noted laterally     Imaging:  X-ray: FINDINGS: There are calcifications at the tip of the medial malleolus and the distal fibula but although they  could represent avulsion fractures, they appear corticated and may be old rather than acute. The ankle otherwise appears unremarkable.

## 2021-06-19 NOTE — LETTER
Letter by Olvin Nguyen MD at      Author: Olvin Nguyen MD Service: -- Author Type: --    Filed:  Encounter Date: 10/25/2019 Status: Signed         Helen Chavez  [address] [address]             October 25, 2019         Dear Ms. Chavez,    Below are the results from your recent visit:    Resulted Orders   Comprehensive Metabolic Panel   Result Value Ref Range    Sodium 136 136 - 145 mmol/L    Potassium 4.3 3.5 - 5.0 mmol/L    Chloride 102 98 - 107 mmol/L    CO2 23 22 - 31 mmol/L    Anion Gap, Calculation 11 5 - 18 mmol/L    Glucose 110 70 - 125 mg/dL    BUN 23 (H) 8 - 22 mg/dL    Creatinine 0.77 0.60 - 1.10 mg/dL    GFR MDRD Af Amer >60 >60 mL/min/1.73m2    GFR MDRD Non Af Amer >60 >60 mL/min/1.73m2    Bilirubin, Total 2.2 (H) 0.0 - 1.0 mg/dL    Calcium 9.7 8.5 - 10.5 mg/dL    Protein, Total 6.8 6.0 - 8.0 g/dL    Albumin 4.1 3.5 - 5.0 g/dL    Alkaline Phosphatase 97 45 - 120 U/L    AST 27 0 - 40 U/L    ALT 33 0 - 45 U/L    Narrative    Fasting Glucose reference range is 70-99 mg/dL per  American Diabetes Association (ADA) guidelines.   Glycosylated Hemoglobin A1c   Result Value Ref Range    Hemoglobin A1c 7.2 (H) 3.5 - 6.0 %       Your liver tests are now normal.  Bilirubin level is okay.    Kidney labs are normal.  BUN level is okay.    Your diabetes is now well controlled!    No change in treatment plan.    Please call with questions or contact us using [employer].    Sincerely,        Electronically signed by Olvin Nguyen MD

## 2021-06-20 ENCOUNTER — HEALTH MAINTENANCE LETTER (OUTPATIENT)
Age: 66
End: 2021-06-20

## 2021-06-20 NOTE — LETTER
Letter by Olvin Nguyen MD at      Author: Olvin Nguyen MD Service: -- Author Type: --    Filed:  Encounter Date: 1/21/2020 Status: Signed         Helen Chavez  [address] [place] [address]             January 21, 2020         Dear Ms. Chavez,    Below are the results from your recent visit:    Resulted Orders   Comprehensive Metabolic Panel   Result Value Ref Range    Sodium 140 136 - 145 mmol/L    Potassium 4.4 3.5 - 5.0 mmol/L    Chloride 105 98 - 107 mmol/L    CO2 22 22 - 31 mmol/L    Anion Gap, Calculation 13 5 - 18 mmol/L    Glucose 132 (H) 70 - 125 mg/dL    BUN 14 8 - 22 mg/dL    Creatinine 0.65 0.60 - 1.10 mg/dL    GFR MDRD Af Amer >60 >60 mL/min/1.73m2    GFR MDRD Non Af Amer >60 >60 mL/min/1.73m2    Bilirubin, Total 1.0 0.0 - 1.0 mg/dL    Calcium 9.2 8.5 - 10.5 mg/dL    Protein, Total 6.7 6.0 - 8.0 g/dL    Albumin 3.7 3.5 - 5.0 g/dL    Alkaline Phosphatase 113 45 - 120 U/L    AST 23 0 - 40 U/L    ALT 30 0 - 45 U/L    Narrative    Fasting Glucose reference range is 70-99 mg/dL per  American Diabetes Association (ADA) guidelines.   Glycosylated Hemoglobin A1c   Result Value Ref Range    Hemoglobin A1c 6.8 (H) 3.5 - 6.0 %   Lipid Cascade   Result Value Ref Range    Cholesterol 178 <=199 mg/dL    Triglycerides 156 (H) <=149 mg/dL    HDL Cholesterol 52 >=50 mg/dL    LDL Calculated 95 <=129 mg/dL    Patient Fasting > 8hrs? Yes        Kidney and liver tests are normal.    Excellent control of diabetes with hemoglobin A1c of 6.8%.    Excellent cholesterol levels.  Borderline triglyceride levels.    Labs look good.  No change in treatment plan.    Please call with questions or contact us using [employer].    Sincerely,        Electronically signed by Olvin Nguyen MD

## 2021-06-20 NOTE — LETTER
Letter by Cherri Higgins RN at      Author: Cherri Higgins RN Service: -- Author Type: --    Filed:  Encounter Date: 7/3/2020 Status: (Other)       7/3/2020        Helen Chavez  [address] [facility] [address]    This letter provides a written record that you were tested for COVID-19 on 7/1/20.     Your result was negative. This means that we didnt find the virus that causes COVID-19 in your sample. A test may show negative when you do actually have the virus. This can happen when the virus is in the early stages of infection, before you feel illness symptoms.    If you have symptoms   Stay home and away from others (self-isolate) until you meet ALL of the guidelines below:    Youve had no fever--and no medicine that reduces fever--for 3 full days (72 hours). And ?    Your other symptoms have gotten better. For example, your cough or breathing has improved. And?    At least 10 days have passed since your symptoms started.    During this time:    Stay home. Dont go to work, school or anywhere else.     Stay in your own room, including for meals. Use your own bathroom if you can.    Stay away from others in your home. No hugging, kissing or shaking hands. No visitors.    Clean high touch surfaces often (doorknobs, counters, handles, etc.). Use a household cleaning spray or wipes. You can find a full list on the EPA website at www.epa.gov/pesticide-registration/list-n-disinfectants-use-against-sars-cov-2.    Cover your mouth and nose with a mask, tissue or washcloth to avoid spreading germs.    Wash your hands and face often with soap and water.    Going back to work  Check with your employer for any guidelines to follow for going back to work.    Employers: This document serves as formal notice that your employee tested negative for COVID-19, as of the testing date shown above.

## 2021-06-22 NOTE — PROGRESS NOTES
"Clinic Note    Assessment:     Assessment and Plan:  1. Diarrhea, unspecified type  She had extensive workup done at the urgency room on 12/28-most of this came back normal with the exception of a small ovarian cyst on the right side.  Stool studies were benign.  Lab work was benign.  Urine was benign.  Going to have her die back on her Metformin over the next week to see if this helps.  She is going to follow-up with me next week for recheck of her symptoms.  In the meantime, we will check for C. difficile.  - C. difficile Toxigenic by PCR; Future    2. Cyst of left ovary  - Ambulatory referral to Gynecology       Patient Instructions   [facility]: [phone]  [facility]: [phone]    Stop taking your metformin for the next week.    Make sure to bring your stool sample back to the lab so we can check it for Clostridium difficile.    Try going to the pharmacy and buying some Citrucel.  Take half a scoop every morning with full glass of water.    Schedule appointment to come back and see me next week for recheck of your symptoms.    Return in about 1 week (around 1/7/2019).         Subjective:      Follow-up of her diarrhea.    Patient comes to clinic today for follow-up of her diarrhea.  Patient called into our nurse triage line on 12/26.  She had been having issues with \"stomach flu\" since the first week in December.  With intermittent nausea.  Intermittent lower abdominal pain.  History of 2 different E. coli infections.  Stools had been soft.  Intermittent uncomfortable cramping sensation.  No blood in the stools.  No black stools.    She was seen at the urgency room on 12/27.  CT imaging was done which revealed a cyst on her right ovary.  It was recommended that she see OB/GYN for this.  Imaging was otherwise normal.  She had a CMP, CBC, lipase, urinalysis-all of which was normal.  She even had stool studies done which were normal.  Clostridium difficile was not " checked.    Patient has been on metformin for the last few years to treat her type 2 diabetes mellitus.  She is on 500 mg XR twice daily.  She is also on glipizide 5 mg twice daily.    As of right now, she is having 1-2 loose stools per day.  Not watery.  Slightly formed.  Having some abdominal pain or quadrant pain.  No blood in stools.  No mucus in stools.  She is using the BRAT diet, but admits to having a fair amount of cheese yesterday.    The following portions of the patient's history were reviewed and updated as appropriate: Allergies, medications, problems, prior note.    Review of Systems:    Review is otherwise negative except for what is mentioned above.     Social Hx:    Social History     Tobacco Use   Smoking Status Never Smoker   Smokeless Tobacco Never Used         Objective:     Vitals:    12/31/18 1548   BP: 138/80   Pulse: 68   Temp: 98.3  F (36.8  C)   TempSrc: Oral   Weight: (!) 227 lb (103 kg)       Exam:    General: No apparent distress. Calm. Alert and Oriented X3. Pt behavior is appropriate.  Patient is obese.  Abdomen: Soft, no palpable masses. No hepatosplenomegaly, no tenderness with palpation noted. Bowel sounds active in all quadrants. No increased tympany.   Neurologic: Interactive, alert, no focal findings, CNs intact.   Skin: Warm, dry. Normal hair pattern. Free of lesions. Normal skin turgor.       Patient Active Problem List   Diagnosis     DM2 (diabetes mellitus, type 2) (H)     HLD (hyperlipidemia)     Menopause     Microalbuminuria     Elevated LFTs     Verruca plantaris     Current Outpatient Medications   Medication Sig Dispense Refill     ACCU-CHEK SMARTVIEW TEST STRIP strips Use twice a day or as directed 100 each 3     aspirin 81 MG EC tablet Take 1 tablet (81 mg total) by mouth daily.  0     garlic 100 mg Tab Take 1 tablet by mouth daily.       glipiZIDE (GLUCOTROL XL) 5 MG 24 hr tablet Take 1 tablet by mouth daily with breakfast 30 tablet 2     ibuprofen (ADVIL) 200 MG  tablet Take 400 mg by mouth every 6 (six) hours as needed for pain.       lancets 25 gauge Misc Use 1 each As Directed 2 (two) times a day.       lisinopril (PRINIVIL,ZESTRIL) 2.5 MG tablet Take 1 tablet by mouth daily 90 tablet 2     metFORMIN (GLUCOPHAGE-XR) 500 MG 24 hr tablet Take 2 tablets (1,000 mg total) by mouth daily with supper. 180 tablet 1     neomycin-polymyxin-dexamethasone (MAXITROL) 3.5mg/mL-10,000 unit/mL-0.1 % ophthalmic suspension        rosuvastatin (CRESTOR) 10 MG tablet Take 1 tablet (10 mg total) by mouth at bedtime 90 tablet 3     UNABLE TO FIND Med Name: Lumeg A-Z vitamin for eye       No current facility-administered medications for this visit.        I spent 25 minutes with patient face to face, of which >50% was counseling regarding the above plan       Glenn Wayne (Rob), MACK    12/31/2018

## 2021-06-22 NOTE — PATIENT INSTRUCTIONS - HE
We will arrange for you to see our clinical pharmacist, Velia Sloan, Pharm.D.  to discuss your diabetes medications.    In the meantime, try to reincorporate the metformin medication back into your regimen.  Start with 1 tablet every evening with dinner.  Do this for a couple of days.  If he seemed to tolerate it well, you can incorporate the other tablet for a total of 2.

## 2021-06-22 NOTE — PROGRESS NOTES
Clinic Note    Assessment:     Assessment and Plan:  1. Loose stools  Doing better since discontinuing metformin.  However, blood sugars have increased significantly.  She is hesitant about increasing her glipizide.  We will trial a resumption of her metformin and have her set up to see our clinical pharmacist about alternative medications that may help with her weight    2. Type 2 diabetes mellitus without complication, without long-term current use of insulin (H)  See #1.  - Ambulatory referral to Medication Management       Patient Instructions   We will arrange for you to see our clinical pharmacist, Velia Sloan, Pharm.D.  to discuss your diabetes medications.    In the meantime, try to reincorporate the metformin medication back into your regimen.  Start with 1 tablet every evening with dinner.  Do this for a couple of days.  If he seemed to tolerate it well, you can incorporate the other tablet for a total of 2.        Return for If symptoms do not start to improve in one month..         Subjective:      Patient comes to clinic today for follow-up of her diarrhea.    I last saw the patient approximately 1 week ago.  At that time, she had already been evaluated at the urgency room and had extensive workup done including CMP, CBC, and CT imaging.  Her workup was normal and she was recommended to follow-up with her PCP.    We decided that we would cut back on her metformin to see if this would help with her abdominal pain, and loose stools.  She had been taking 500 mg XR twice daily.    Today, patient tells me she is feeling much better.  However, her blood sugars have been quite elevated.  They have been around 200 while fasting in the morning.    She is hesitant about increasing her glipizide because she heard that it can cause weight gain.  She is particularly worried about her weight.  She is inquiring about medications that could potentially improve her diabetes as well as her weight.  There has been to some  discussion about Trulicity but she is worried about injections.    The following portions of the patient's history were reviewed and updated as appropriate: Allergies, medications, problem list, prior note.     Review of Systems:    Review is otherwise negative except for what is mentioned above.     Social Hx:    Social History     Tobacco Use   Smoking Status Never Smoker   Smokeless Tobacco Never Used         Objective:     Vitals:    01/08/19 1525   BP: 124/72   Pulse: 66   Weight: (!) 227 lb (103 kg)       Exam:    General: No apparent distress. Calm. Alert and Oriented X3. Pt behavior is appropriate. Pt is obese.       Patient Active Problem List   Diagnosis     DM2 (diabetes mellitus, type 2) (H)     HLD (hyperlipidemia)     Menopause     Microalbuminuria     Elevated LFTs     Verruca plantaris     Current Outpatient Medications   Medication Sig Dispense Refill     ACCU-CHEK SMARTVIEW TEST STRIP strips Use twice a day or as directed 100 each 3     aspirin 81 MG EC tablet Take 1 tablet (81 mg total) by mouth daily.  0     garlic 100 mg Tab Take 1 tablet by mouth daily.       glipiZIDE (GLUCOTROL XL) 5 MG 24 hr tablet Take 1 tablet by mouth daily with breakfast 30 tablet 2     ibuprofen (ADVIL) 200 MG tablet Take 400 mg by mouth every 6 (six) hours as needed for pain.       lancets 25 gauge Misc Use 1 each As Directed 2 (two) times a day.       lisinopril (PRINIVIL,ZESTRIL) 2.5 MG tablet Take 1 tablet by mouth daily 90 tablet 2     neomycin-polymyxin-dexamethasone (MAXITROL) 3.5mg/mL-10,000 unit/mL-0.1 % ophthalmic suspension        rosuvastatin (CRESTOR) 10 MG tablet Take 1 tablet (10 mg total) by mouth at bedtime 90 tablet 3     UNABLE TO FIND Med Name: Lumeg A-Z vitamin for eye       metFORMIN (GLUCOPHAGE-XR) 500 MG 24 hr tablet Take 2 tablets (1,000 mg total) by mouth daily with supper. 180 tablet 1     No current facility-administered medications for this visit.        I spent 25 minutes with patient face  to face, of which >50% was counseling regarding the above plan       Glenn Wayne (Rob), MACK    1/8/2019

## 2021-06-23 NOTE — TELEPHONE ENCOUNTER
Reports she started Ozempic last Wednesday and is no longer on glipizide. Second dose of 0.25 mg will be today. AM blood sugars used to be 180-230 and in afternoon before dinner low 100s and now 250-280s. Denies nausea or changes in appetite. No values 400-500s.     Reviewed that she is on a low dose and it may take her to get to the 0.5 mg to start to see improvements in BG. Recommended that she be patient and not panic unless her blood sugars increase to 400-500, which I dont think will happen with the readings she has told me.  Reviewed Ozempic will work on post prandial values. Tolerating well.    Questions were answered and she plans on following up with Velia in March.     Janice Dominguez, Pharm.D., [facility]  Medication Therapy Management Pharmacist  [facility] and [facility]

## 2021-06-23 NOTE — PATIENT INSTRUCTIONS - HE
Recommendations from today's Medication Management (MTM) visit:                                                      1. Stop glipizide.     2. Restart metformin  mg (1 tablet) daily.     3. Start Ozempic. Inject 0.25 mg once a week for 4 weeks, then increase to 0.5 mg injected once a week.     4. Check blood sugars twice a day - fasting in the morning and ~2 hours after evening meal.     Blood Sugar Goals  Before meals:   2 hours after meal: less than 180     Next MTM appointment:                                                             To schedule another MTM appointment, please call the clinic directly or you may call   the MTM scheduling line at [phone] or toll-free at [phone].     My MTM pharmacist's contact information:                                                      It was a pleasure seeing you today. Thank you for your engagement in getting the most out of your medications! Please feel free to contact me with any questions or concerns you have.      Velia Cuellar, PharmD, [facility]  Medication Management (MTM) Pharmacist  [facility]  [phone]      You may receive a survey about the MTM services you received.  I would appreciate your feedback to help me serve you better in the future. Please fill it out and return it when you can. Your comments will be anonymous.

## 2021-06-23 NOTE — PROGRESS NOTES
MTM Initial Encounter  Assessment & Plan                                                     1. Type 2 diabetes mellitus  Uncontrolled as evidenced by elevated reported fasting blood sugars as well as most recent A1c of 8%.  Goal A1c of less than 7%.  Discussed medication options and recommended initiation of GLP-1 agonist for added benefits of weight loss and preservation of pancreatic beta cell function. Ozempic is on formulary and patient is agreeable to start medication. Medication education and counseling was provided, including indication, expected effect, dosing instructions, and possible side effects. The patient's questions were answered.  She demonstrated understanding with use of a demo pen.  Also recommended discontinuation of glipizide to minimize weight gain and recommended restarting metformin but at a lower dose of 500 mg daily which she has tolerated.  In the future could try slowly increasing dose again.  Discussed optimal timing of checking blood sugars and goals both for fasting and postprandial readings.  Plan   1. Stop glipizide.   2. Restart metformin  mg (1 tablet) daily.   3. Start Ozempic. Inject 0.25 mg once a week for 4 weeks, then increase to 0.5 mg injected once a week.   4. Check blood sugars twice a day - fasting in the morning and ~2 hours after evening meal.     2. Microalbuminuria  Appropriately on ACE inhibitor. Blood pressure is well controlled and meeting goal of <140/90 mm Hg per JNC-8 hypertension guidelines.     3. Hyperlipidemia  Appropriately on a moderate intensity statin given age and diabetes diagnosis per ACC/AHA guidelines. Last LDL of 86 mg/dl.       Follow Up  Return in about 6 weeks (around 3/4/2019).      Subjective & Objective                                                     Helen Chavez is a 63 y.o. female coming in for an initial visit for Medication Therapy Management. She was referred to me from Clover Felix MD    Chief Complaint:  Uncontrolled DM  Medication Adherence/Access: Good; no issues identified.     1. Type 2 diabetes mellitus  Helen is taking glipizide ER 5 mg daily.  She takes aspirin 81 mg daily.  She hasn't been taking metformin for the past few days. She cut back on her metformin due to GI side effects/diarrhea and that helped.  She tried restarting the metformin taking 1 tablet of extended release 500 mg daily and did well with this with no side effects but then when she increased to 2 tablets or diarrhea returned.  She is hesitant to increase glipizide dose for her BG due to concern with weight gain.   She notes that her morning BG tend to be highest. Before meals is usually less than 100.   She feels her diet is very healthy, but she needs to do better about exercise. She's seen a nutritionist.    Lab Results   Component Value Date    HGBA1C 8.0 (H) 05/03/2018    HGBA1C 7.1 (H) 11/01/2017    HGBA1C 9.2 (H) 08/01/2017     Lab Results   Component Value Date    MICROALBUR 14.77 (H) 08/01/2017    LDLCALC 86 08/01/2017    CREATININE 0.68 05/03/2018       2. Microalbuminuria  Helen is taking lisinopril 2.5 mg daily. She takes this primarily for renal protection.    3. Hyperlipidemia  Helen is taking rosuvastatin 10 mg daily.  She states that she takes this faithfully every evening and understands it is for cholesterol.  No adverse effects noted.    The 10-year ASCVD risk score ([place] RDZ Jr., et al., 2013) is: 9.3%    Values used to calculate the score:      Age: 63 years      Sex: Female      Is Non- : No      Diabetic: Yes      Tobacco smoker: No      Systolic Blood Pressure: 132 mmHg      Is BP treated: No      HDL Cholesterol: 50 mg/dL      Total Cholesterol: 162 mg/dL      PMH: reviewed in EPIC   Allergies/ADRs: reviewed in EPIC   Alcohol: reviewed in EPIC   Tobacco:   Social History     Tobacco Use   Smoking Status Never Smoker   Smokeless Tobacco Never Used     Today's Vitals:   BP Readings from  Last 3 Encounters:   01/21/19 132/80   01/08/19 124/72   12/31/18 138/80     Pulse Readings from Last 3 Encounters:   01/21/19 70   01/08/19 66   12/31/18 68     Wt Readings from Last 3 Encounters:   01/21/19 (!) 227 lb (103 kg)   01/08/19 (!) 227 lb (103 kg)   12/31/18 (!) 227 lb (103 kg)       ----------------    Much or all of the text in this note was generated through the use of Dragon Dictate voice-to-text software. Errors in spelling or words which seem out of context are unintentional. Sound alike errors, in particular, may have escaped editing.    The patient was given a summary of these recommendations as an after visit summary    I spent 60 minutes with this patient today.   All changes were made via collaborative practice agreement with Clover Felix MD. A copy of the visit note was provided to the patient's provider.     Velia Cuellar, PharmD, BCACP  Medication Management Pharmacist  [facility]        Current Outpatient Medications   Medication Sig Dispense Refill     ACCU-CHEK SMARTVIEW TEST STRIP strips Use twice a day or as directed 100 each 3     aspirin 81 MG EC tablet Take 1 tablet (81 mg total) by mouth daily.  0     glipiZIDE (GLUCOTROL XL) 5 MG 24 hr tablet Take 1 tablet by mouth daily with breakfast 30 tablet 2     lancets 25 gauge Misc Use 1 each As Directed 2 (two) times a day.       lisinopril (PRINIVIL,ZESTRIL) 2.5 MG tablet Take 1 tablet (2.5 mg total) by mouth daily. 90 tablet 3     metFORMIN (GLUCOPHAGE-XR) 500 MG 24 hr tablet Take 2 tablets (1,000 mg total) by mouth daily with supper. 180 tablet 1     rosuvastatin (CRESTOR) 10 MG tablet Take 1 tablet (10 mg total) by mouth at bedtime 90 tablet 3     semaglutide (OZEMPIC) 0.25 mg or 0.5 mg(2 mg/1.5 mL) PnIj Inject 0.25 mg under the skin every 7 days for 28 days, THEN 0.5 mg every 7 days. 2 Syringe 1     UNABLE TO FIND Med Name: Lumeg A-Z vitamin for eye       No current facility-administered medications  for this visit.

## 2021-06-23 NOTE — TELEPHONE ENCOUNTER
Called Myrna at 330pm -- no answer, left voicemail. Will have covering pharmacist try again tomorrow.     There is definetly a change that her lisinopril is causing the dry cough. Could consider holding to verify and/or switching to low-dose ARB.     Janice Dominguez, Pharm.D., BCACP  Medication Therapy Management Pharmacist  [facility] and [facility]

## 2021-06-23 NOTE — TELEPHONE ENCOUNTER
Question for PharmMYRON Cuellar per patient    Medication Question or Clarification  Who is calling: Patient  What medication are you calling about?: lisinopril (PRINIVIL,ZESTRIL) 2.5 MG tablet  What dose do you take?: 2.5  How often are you taking the medication?: daily  Who prescribed the medication?: Clover Felix MD/Velia Cuellar, PharmD  What is your question/concern?: Patient is having a dry cough consistently and wondering if above medication is the reason and if so does PharmD want to change medication.    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  Pharmacy: [facility] [place]  Okay to leave a detailed message?: Yes  Site CMT - Please call the pharmacy to obtain any additional needed information.

## 2021-06-23 NOTE — TELEPHONE ENCOUNTER
Patient called and would like a call back. She started Ozempic last week and tonight is supposed to be her second injection. She is having high readings that at first were going down in the afternoon but now they are staying high and she is very concerned and does not know what to do.     Please give her a call back at [phone]. I did tell her Velia was out sick and a colleague would be calling her back.     Thanks,  JORDAN Wynn Coordinator

## 2021-06-24 NOTE — TELEPHONE ENCOUNTER
Left detailed message for patient with recommendation to stop lisinopril and start alternative medication, losartan 25 mg daily. Rx sent to [facility].

## 2021-06-24 NOTE — PATIENT INSTRUCTIONS - HE
Recommendations from today's Medication Management (MTM) visit:                                                      1. Increase Ozempic to 1 mg injected weekly.     Next MTM appointment:                                                       Monday, April 29th at 3 PM      To schedule another MTM appointment, please call the clinic directly or you may call   the MTM scheduling line at [phone] or toll-free at [phone].     My MTM pharmacist's contact information:                                                      It was a pleasure seeing you today. Thank you for your engagement in getting the most out of your medications! Please feel free to contact me with any questions or concerns you have.      Velia Cuellar, PharmD, [facility]  Medication Management (MTM) Pharmacist  [facility]  [phone]    You may receive a survey about the MTM services you received by email and/or US Mail.  I would appreciate your feedback to help me serve you better in the future. Your comments will be anonymous.

## 2021-06-24 NOTE — PROGRESS NOTES
MTM Initial Encounter  Assessment & Plan                                                     1. Type 2 diabetes mellitus  Now off glipizide and started Ozempic. Explained to patient that with dose titration of Ozempic, blood sugars can be higher initially, but will improve over time and with dose increases. Will plan on increasing to maximum dose Ozempic 1 mg SQ weekly. If needed, we can add back a low dose of glipizide. Will recheck A1c in 2 months. She agreed with plan.   Plan   1. Increase Ozempic to 1 mg SQ weekly.     2. Microalbuminuria  Appropriately on ARB. Cough improved off lisinopril. Blood pressure is well controlled and meeting goal of <140/90 mm Hg per JNC-8 hypertension guidelines.     3. Hyperlipidemia  Appropriately on a moderate intensity statin given age and diabetes diagnosis per ACC/AHA guidelines. Last LDL of 86 mg/dl.       Follow Up  Return in about 2 months (around 5/1/2019).      Subjective & Objective                                                     Helen Chavez is a 63 y.o. female coming in for an initial visit for Medication Therapy Management. She was referred to me from Clover Felix MD    Chief Complaint: Follow up med changes  Medication Adherence/Access: Good; no issues identified.     1. Type 2 diabetes mellitus  Helen is taking Ozempic 0.5 mg SQ weekly and metformin  mg daily. At our last visit, we stopped glipizide and started Ozempic. Starting med went well with no GI side effects. She did notice high blood sugars, which concerns her. She is unable to tolerate a higher dose of metformin due to diarrhea. She has seen a decrease in her appetite. Now BG are in the 200 range. No lows. She feels her diet is very healthy, but she needs to do better about exercise. She's seen a nutritionist.     Lab Results   Component Value Date    HGBA1C 8.0 (H) 05/03/2018    HGBA1C 7.1 (H) 11/01/2017    HGBA1C 9.2 (H) 08/01/2017     Lab Results   Component Value Date     MICROALBUR 14.77 (H) 08/01/2017    LDLCALC 86 08/01/2017    CREATININE 0.68 05/03/2018       2. Microalbuminuria  Helen is taking losartan 25 mg daily. We changed from lisinopril due to cough. She takes this primarily for renal protection. She feels this is going well. No concerns.     3. Hyperlipidemia  Helen is taking rosuvastatin 10 mg daily.  She states that she takes this faithfully every evening and understands it is for cholesterol.  No adverse effects noted.    The 10-year ASCVD risk score ([place] KENDELL Jr., et al., 2013) is: 9.3%    Values used to calculate the score:      Age: 63 years      Sex: Female      Is Non- : No      Diabetic: Yes      Tobacco smoker: No      Systolic Blood Pressure: 132 mmHg      Is BP treated: No      HDL Cholesterol: 50 mg/dL      Total Cholesterol: 162 mg/dL      PMH: reviewed in EPIC   Allergies/ADRs: reviewed in EPIC   Alcohol: reviewed in EPIC   Tobacco:   Social History     Tobacco Use   Smoking Status Never Smoker   Smokeless Tobacco Never Used     Today's Vitals:   BP Readings from Last 3 Encounters:   03/01/19 132/74   01/21/19 132/80   01/08/19 124/72     Pulse Readings from Last 3 Encounters:   03/01/19 76   01/21/19 70   01/08/19 66     Wt Readings from Last 3 Encounters:   03/01/19 (!) 224 lb 14.4 oz (102 kg)   01/21/19 (!) 227 lb (103 kg)   01/08/19 (!) 227 lb (103 kg)       ----------------    Much or all of the text in this note was generated through the use of Dragon Dictate voice-to-text software. Errors in spelling or words which seem out of context are unintentional. Sound alike errors, in particular, may have escaped editing.    The patient was given a summary of these recommendations via [employer]    I spent 30 minutes with this patient today.   All changes were made via collaborative practice agreement with Clover Felix MD. A copy of the visit note was provided to the patient's provider.     Velia Cuellar, EverD,  BCACP  Medication Management Pharmacist  [facility]        Current Outpatient Medications   Medication Sig Dispense Refill     ACCU-CHEK SMARTVIEW TEST STRIP strips Use twice a day or as directed 100 each 3     aspirin 81 MG EC tablet Take 1 tablet (81 mg total) by mouth daily.  0     lancets 25 gauge Misc Use 1 each As Directed 2 (two) times a day.       losartan (COZAAR) 25 MG tablet Take 1 tablet (25 mg total) by mouth daily. 30 tablet 3     metFORMIN (GLUCOPHAGE-XR) 500 MG 24 hr tablet Take 2 tablets (1,000 mg total) by mouth daily with supper. 180 tablet 1     rosuvastatin (CRESTOR) 10 MG tablet Take 1 tablet (10 mg total) by mouth at bedtime 90 tablet 3     semaglutide (OZEMPIC) 1 mg/0.75 mL (2 mg/1.5 mL) PnIj Inject 1 mg under the skin every 7 days. 3 mL 3     UNABLE TO FIND Med Name: Lumeg A-Z vitamin for eye       No current facility-administered medications for this visit.

## 2021-06-24 NOTE — TELEPHONE ENCOUNTER
Patient Returning Call  [facility]  [facility]  Information relayed to patient:  Let patient know below information on new medication sent over. Patient stated she received medication but will not take it at this time. (see below)  Patient has additional questions:  Yes  If YES, what are your questions/concerns:  Is this the correct dose?  Patient reports she feels this is too high of a dose. She refuses to take it at this time because she wants to ensure Velia meant to send over for patient 25 mg and not for patient to take 12.5 mg.   Please verify with patient what dose she should be on.  She stated it is more for her diabetes than her hypertension and that's why she was on the lowest dose of lisinopril.   Please verify dosage on medication.   Okay to leave a detailed message?: Yes

## 2021-06-24 NOTE — TELEPHONE ENCOUNTER
Called and left message explaining losartan dose of 25 mg is the lowest strength tablet made and with her recent BPs, I don't think it would drop her BP too low. I did say she could take a half tablet (12.5 mg) daily if she felt more comfortable doing that.

## 2021-06-24 NOTE — TELEPHONE ENCOUNTER
Refill Given    Refill given per Policy, patient informed they are overdue for Physical /diabetes med check    Lupis Arambula, [facility] Triage/Med Refill 3/5/2019    Requested Prescriptions   Pending Prescriptions Disp Refills     blood glucose test (ACCU-CHEK SMARTVIEW TEST STRIP) strips  0     Sig: Dispense brand per patient's insurance at pharmacy discretion.    Diabetic Supplies Refill Protocol Failed - 3/5/2019  7:26 AM       Failed - A1C in last 6 months    Hemoglobin A1c   Date Value Ref Range Status   05/03/2018 8.0 (H) 3.5 - 6.0 % Final              Passed - Visit with PCP or prescribing provider visit in last 6 months    Last office visit with prescriber/PCP: 5/3/2018 Clover Felix MD OR same dept: 1/8/2019 Glenn Wayne CNP OR same specialty: 1/8/2019 Glenn Wayne CNP  Last physical: 8/1/2017 Last MTM visit: Visit date not found   Next visit within 3 mo: Visit date not found  Next physical within 3 mo: Visit date not found  Prescriber OR PCP: Clover Felix MD  Last diagnosis associated with med order: 1. DM2 (diabetes mellitus, type 2) (H)  - blood glucose test (ACCU-CHEK SMARTVIEW TEST STRIP) strips; Dispense brand per patient's insurance at pharmacy discretion.; Refill: 0    If protocol passes may refill for 12 months if within 3 months of last provider visit (or a total of 15 months).
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
## 2021-07-03 NOTE — ADDENDUM NOTE
Addendum Note by Demetrius Joshi at 7/24/2019  1:00 PM     Author: Demetrius Joshi Service: -- Author Type:     Filed: 7/26/2019  7:24 AM Encounter Date: 7/24/2019 Status: Signed    : Demetrius Joshi ()    Addended by: DEMETRIUS JOSHI on: 7/26/2019 07:24 AM        Modules accepted: Orders

## 2021-07-03 NOTE — ADDENDUM NOTE
Addendum Note by Velia Starkey, Tatyana at 4/1/2019  3:28 PM     Author: Velia Starkey, Tatyana Service: -- Author Type: Pharmacist    Filed: 4/1/2019  3:28 PM Encounter Date: 3/15/2019 Status: Signed    : Velia Starkey PharmD (Pharmacist)    Addended by: VELIA STARKEY on: 4/1/2019 03:28 PM        Modules accepted: Orders

## 2021-07-03 NOTE — ADDENDUM NOTE
Addendum Note by Clover Felix at 2/23/2017  4:18 PM     Author: Clover Felix Service: -- Author Type: Physician    Filed: 2/23/2017  4:18 PM Encounter Date: 2/23/2017 Status: Signed    : Clover Felix    Addended by: CLOVER FELIX on: 2/23/2017 04:18 PM        Modules accepted: Orders

## 2021-07-03 NOTE — ADDENDUM NOTE
Addendum Note by Michael Cueva DO at 1/10/2018 12:17 PM     Author: Michael Cueva DO Service: -- Author Type: Physician    Filed: 1/10/2018 12:17 PM Encounter Date: 1/8/2018 Status: Signed    : Michael Cueva DO (Physician)    Addended by: MICHAEL CUEVA on: 1/10/2018 12:17 PM        Modules accepted: Orders

## 2021-07-07 ENCOUNTER — COMMUNICATION - HEALTHEAST (OUTPATIENT)
Dept: ADMINISTRATIVE | Facility: CLINIC | Age: 66
End: 2021-07-07

## 2021-07-07 DIAGNOSIS — E11.9 DM2 (DIABETES MELLITUS, TYPE 2) (H): ICD-10-CM

## 2021-07-07 RX ORDER — BLOOD SUGAR DIAGNOSTIC
1 STRIP MISCELLANEOUS DAILY
Qty: 90 STRIP | Refills: 3 | Status: SHIPPED | OUTPATIENT
Start: 2021-07-07 | End: 2023-09-06

## 2021-07-07 NOTE — TELEPHONE ENCOUNTER
"Telephone Encounter by Marimar Nickerson CMA at 7/7/2021  9:22 AM     Author: Marimar Nickerson CMA Service: -- Author Type: Certified Medical Assistant    Filed: 7/7/2021  9:30 AM Encounter Date: 7/7/2021 Status: Addendum    : Marimar Nickerson CMA (Certified Medical Assistant)    Related Notes: Original Note by Marimar Nickerson CMA (Certified Medical Assistant) filed at 7/7/2021  9:27 AM       Can a clinic note get an addendum for the patient to be allowed to get dispensed #200 Accu-Chek Smartview Strips per 3 months for a testing frequency of 2x per day for non-insulin treated diabetes?     The patient's pharmacy is requesting medical records stating why the patient is testing blood sugars 2 times daily, which is \"more than is allowed\". Documentation from an appointment with Velia Cuellar PharmD noted that the patient does check a morning fasting blood sugar and a pre-meal blood sugar each day. The fax also stated that alteratively the patient could be allowed #100 test strips per 30 days for a testing frequency of 3x per day.        "

## 2021-07-07 NOTE — TELEPHONE ENCOUNTER
Telephone Encounter by Olvin Nguyen MD at 7/7/2021  9:38 AM     Author: Olvin Nguyen MD Service: -- Author Type: Physician    Filed: 7/7/2021  9:38 AM Encounter Date: 7/7/2021 Status: Signed    : Olvin Nguyen MD (Physician)       Patient is not on insulin.  Patient on Ozempic and Metformin.    Patient only needs to check blood sugars once a day.  Can have up to 1 test strip per day.    I did send a new prescription to pharmacy for once a day checks

## 2021-07-13 ENCOUNTER — RECORDS - HEALTHEAST (OUTPATIENT)
Dept: ADMINISTRATIVE | Facility: CLINIC | Age: 66
End: 2021-07-13

## 2021-07-21 ENCOUNTER — RECORDS - HEALTHEAST (OUTPATIENT)
Dept: ADMINISTRATIVE | Facility: CLINIC | Age: 66
End: 2021-07-21

## 2021-08-01 DIAGNOSIS — R80.9 MICROALBUMINURIA: ICD-10-CM

## 2021-08-01 NOTE — TELEPHONE ENCOUNTER
Refill Request  Medication name: Pending Prescriptions:                       Disp   Refills    lisinopril (ZESTRIL) 2.5 MG tablet        90 tab*0            Sig: Take 1 tablet (2.5 mg) by mouth daily    Who prescribed the medication: PCP  Last refill on medication: 5/3/21  [facility]ed [facility]  Last appointment with PCP: 5/3/21  Next appointment: Not due

## 2021-08-03 DIAGNOSIS — R80.9 MICROALBUMINURIA: ICD-10-CM

## 2021-08-03 RX ORDER — LISINOPRIL 2.5 MG/1
2.5 TABLET ORAL DAILY
Qty: 90 TABLET | Refills: 3 | Status: SHIPPED | OUTPATIENT
Start: 2021-08-03 | End: 2021-12-17

## 2021-08-03 RX ORDER — LISINOPRIL 2.5 MG/1
2.5 TABLET ORAL DAILY
Qty: 90 TABLET | Refills: 0 | Status: SHIPPED | OUTPATIENT
Start: 2021-08-03 | End: 2021-08-03

## 2021-08-16 DIAGNOSIS — E78.5 HYPERLIPIDEMIA: ICD-10-CM

## 2021-08-16 DIAGNOSIS — E78.00 HYPERCHOLESTEROLEMIA: Primary | ICD-10-CM

## 2021-08-16 NOTE — TELEPHONE ENCOUNTER
Refill Request  Medication name: Pending Prescriptions:                       Disp   Refills    rosuvastatin (CRESTOR) 10 MG tablet       90 tab*0            Sig: Take 1 tablet (10 mg) by mouth At Bedtime    Who prescribed the medication: Patrick  Last refill on medication: 05/03/21  [facility]ed [facility]acy[facility]  Last appointment with PCP: 01/20/20  Next appointment: Not due

## 2021-08-17 RX ORDER — ROSUVASTATIN CALCIUM 10 MG/1
10 TABLET, COATED ORAL AT BEDTIME
Qty: 90 TABLET | Refills: 0 | Status: SHIPPED | OUTPATIENT
Start: 2021-08-17 | End: 2021-09-08

## 2021-09-08 DIAGNOSIS — E78.00 HYPERCHOLESTEROLEMIA: ICD-10-CM

## 2021-09-08 RX ORDER — ROSUVASTATIN CALCIUM 10 MG/1
10 TABLET, COATED ORAL AT BEDTIME
Qty: 90 TABLET | Refills: 1 | Status: SHIPPED | OUTPATIENT
Start: 2021-09-08 | End: 2021-12-17

## 2021-09-08 NOTE — TELEPHONE ENCOUNTER
Refill Request  Medication name: Pending Prescriptions:                       Disp   Refills    rosuvastatin (CRESTOR) 10 MG tablet       90 tab*0            Sig: Take 1 tablet (10 mg) by mouth At Bedtime    Who prescribed the medication: Patrick  Last refill on medication: 08/17/21  [facility]ed [facility]acy[facility]  Last appointment with PCP: 05/03/21  Next appointment: Not due

## 2021-09-15 DIAGNOSIS — E11.9 TYPE 2 DIABETES MELLITUS WITHOUT COMPLICATION, WITHOUT LONG-TERM CURRENT USE OF INSULIN (H): ICD-10-CM

## 2021-09-15 NOTE — TELEPHONE ENCOUNTER
Refill Request  Medication name: Pending Prescriptions:                       Disp   Refills    metFORMIN (GLUCOPHAGE-XR) 500 MG 24 hr ta*90 tab*0            Sig: Take 1 tablet (500 mg) by mouth daily    Who prescribed the medication: PCP  Last refill on medication: 5/3/21  Requested Pharmacy: [facility]  Last appointment with PCP: 5/3/21  Next appointment: Not due

## 2021-09-16 RX ORDER — METFORMIN HCL 500 MG
500 TABLET, EXTENDED RELEASE 24 HR ORAL DAILY
Qty: 90 TABLET | Refills: 0 | Status: SHIPPED | OUTPATIENT
Start: 2021-09-16 | End: 2021-12-17

## 2021-10-08 ENCOUNTER — ANCILLARY PROCEDURE (OUTPATIENT)
Dept: BONE DENSITY | Facility: CLINIC | Age: 66
End: 2021-10-08
Attending: OBSTETRICS & GYNECOLOGY
Payer: COMMERCIAL

## 2021-10-08 DIAGNOSIS — Z13.820 ENCOUNTER FOR SCREENING FOR OSTEOPOROSIS: ICD-10-CM

## 2021-10-08 PROCEDURE — 77080 DXA BONE DENSITY AXIAL: CPT | Mod: TC | Performed by: RADIOLOGY

## 2021-10-10 ENCOUNTER — HEALTH MAINTENANCE LETTER (OUTPATIENT)
Age: 66
End: 2021-10-10

## 2021-11-07 DIAGNOSIS — R80.9 MICROALBUMINURIA: ICD-10-CM

## 2021-11-09 RX ORDER — LISINOPRIL 2.5 MG/1
TABLET ORAL
Qty: 90 TABLET | Refills: 0 | OUTPATIENT
Start: 2021-11-09

## 2021-11-12 DIAGNOSIS — R80.9 MICROALBUMINURIA: ICD-10-CM

## 2021-11-14 RX ORDER — LISINOPRIL 2.5 MG/1
TABLET ORAL
Qty: 90 TABLET | Refills: 0 | OUTPATIENT
Start: 2021-11-14

## 2021-11-14 NOTE — TELEPHONE ENCOUNTER
Patient should have refills remaining on file at pharmacy.  Lisinopril filled 8/3/2021#90 tablets with 3 refills.    lisinopril (ZESTRIL) 2.5 MG tablet 90 tablet 3 8/3/2021  No   Sig - Route: Take 1 tablet (2.5 mg) by mouth daily - Oral   Sent to pharmacy as: Lisinopril 2.5 MG Oral Tablet (ZESTRIL)   Class: E-Prescribe   Order: [id number]   E-Prescribing Status: Receipt confirmed by pharmacy (8/3/2021  5:12 PM CDT)     Porsche Toth, RN  Triage Nurse Advisor

## 2021-11-16 DIAGNOSIS — E11.9 TYPE 2 DIABETES MELLITUS WITHOUT COMPLICATION, WITHOUT LONG-TERM CURRENT USE OF INSULIN (H): Primary | ICD-10-CM

## 2021-11-17 ENCOUNTER — HOSPITAL ENCOUNTER (OUTPATIENT)
Dept: MAMMOGRAPHY | Facility: CLINIC | Age: 66
Discharge: HOME | End: 2021-11-17
Attending: OBSTETRICS & GYNECOLOGY | Admitting: OBSTETRICS & GYNECOLOGY
Payer: COMMERCIAL

## 2021-11-17 DIAGNOSIS — Z12.31 SCREENING MAMMOGRAM, ENCOUNTER FOR: ICD-10-CM

## 2021-11-17 DIAGNOSIS — E11.9 TYPE 2 DIABETES MELLITUS WITHOUT COMPLICATION, WITHOUT LONG-TERM CURRENT USE OF INSULIN (H): ICD-10-CM

## 2021-11-17 PROCEDURE — 77063 BREAST TOMOSYNTHESIS BI: CPT

## 2021-12-04 ENCOUNTER — HEALTH MAINTENANCE LETTER (OUTPATIENT)
Age: 66
End: 2021-12-04

## 2021-12-17 ENCOUNTER — OFFICE VISIT (OUTPATIENT)
Dept: INTERNAL MEDICINE | Facility: CLINIC | Age: 66
End: 2021-12-17
Payer: COMMERCIAL

## 2021-12-17 VITALS
HEART RATE: 68 BPM | BODY MASS INDEX: 31.39 KG/M2 | OXYGEN SATURATION: 97 % | WEIGHT: 200 LBS | DIASTOLIC BLOOD PRESSURE: 90 MMHG | SYSTOLIC BLOOD PRESSURE: 150 MMHG | HEIGHT: 67 IN

## 2021-12-17 DIAGNOSIS — I10 ESSENTIAL HYPERTENSION: ICD-10-CM

## 2021-12-17 DIAGNOSIS — Z51.81 ENCOUNTER FOR THERAPEUTIC DRUG MONITORING: ICD-10-CM

## 2021-12-17 DIAGNOSIS — E55.9 VITAMIN D DEFICIENCY: ICD-10-CM

## 2021-12-17 DIAGNOSIS — N83.202 LEFT OVARIAN CYST: ICD-10-CM

## 2021-12-17 DIAGNOSIS — E11.9 TYPE 2 DIABETES MELLITUS WITHOUT COMPLICATION, WITHOUT LONG-TERM CURRENT USE OF INSULIN (H): Primary | ICD-10-CM

## 2021-12-17 DIAGNOSIS — Z86.0100 HISTORY OF COLONIC POLYPS: ICD-10-CM

## 2021-12-17 DIAGNOSIS — E78.00 HYPERCHOLESTEROLEMIA: ICD-10-CM

## 2021-12-17 LAB
ALBUMIN SERPL-MCNC: 4.2 G/DL (ref 3.5–5)
ALP SERPL-CCNC: 108 U/L (ref 45–120)
ALT SERPL W P-5'-P-CCNC: 35 U/L (ref 0–45)
ANION GAP SERPL CALCULATED.3IONS-SCNC: 11 MMOL/L (ref 5–18)
AST SERPL W P-5'-P-CCNC: 23 U/L (ref 0–40)
BILIRUB SERPL-MCNC: 1.4 MG/DL (ref 0–1)
BUN SERPL-MCNC: 18 MG/DL (ref 8–22)
CALCIUM SERPL-MCNC: 9.8 MG/DL (ref 8.5–10.5)
CHLORIDE BLD-SCNC: 102 MMOL/L (ref 98–107)
CO2 SERPL-SCNC: 25 MMOL/L (ref 22–31)
CREAT SERPL-MCNC: 0.64 MG/DL (ref 0.6–1.1)
GFR SERPL CREATININE-BSD FRML MDRD: >90 ML/MIN/1.73M2
GLUCOSE BLD-MCNC: 135 MG/DL (ref 70–125)
HBA1C MFR BLD: 6.8 % (ref 0–5.6)
POTASSIUM BLD-SCNC: 4.5 MMOL/L (ref 3.5–5)
PROT SERPL-MCNC: 7.3 G/DL (ref 6–8)
SODIUM SERPL-SCNC: 138 MMOL/L (ref 136–145)

## 2021-12-17 PROCEDURE — 80053 COMPREHEN METABOLIC PANEL: CPT | Performed by: INTERNAL MEDICINE

## 2021-12-17 PROCEDURE — 99214 OFFICE O/P EST MOD 30 MIN: CPT | Performed by: INTERNAL MEDICINE

## 2021-12-17 PROCEDURE — 36415 COLL VENOUS BLD VENIPUNCTURE: CPT | Performed by: INTERNAL MEDICINE

## 2021-12-17 PROCEDURE — 83036 HEMOGLOBIN GLYCOSYLATED A1C: CPT | Performed by: INTERNAL MEDICINE

## 2021-12-17 PROCEDURE — 82306 VITAMIN D 25 HYDROXY: CPT | Performed by: INTERNAL MEDICINE

## 2021-12-17 RX ORDER — MULTIVITAMIN WITH IRON
1 TABLET ORAL DAILY
COMMUNITY

## 2021-12-17 RX ORDER — CLOBETASOL PROPIONATE 0.5 MG/G
OINTMENT TOPICAL
COMMUNITY
End: 2024-04-19

## 2021-12-17 RX ORDER — METFORMIN HCL 500 MG
500 TABLET, EXTENDED RELEASE 24 HR ORAL DAILY
Qty: 90 TABLET | Refills: 3 | Status: SHIPPED | OUTPATIENT
Start: 2021-12-17 | End: 2023-03-02

## 2021-12-17 RX ORDER — ROSUVASTATIN CALCIUM 10 MG/1
10 TABLET, COATED ORAL AT BEDTIME
Qty: 90 TABLET | Refills: 3 | Status: SHIPPED | OUTPATIENT
Start: 2021-12-17 | End: 2022-05-12

## 2021-12-17 RX ORDER — LISINOPRIL 2.5 MG/1
2.5 TABLET ORAL DAILY
Qty: 90 TABLET | Refills: 3 | Status: SHIPPED | OUTPATIENT
Start: 2021-12-17 | End: 2023-02-09

## 2021-12-17 ASSESSMENT — MIFFLIN-ST. JEOR: SCORE: 1466.88
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
## 2021-12-19 LAB — DEPRECATED CALCIDIOL+CALCIFEROL SERPL-MC: 62 UG/L (ref 30–80)

## 2022-02-23 ENCOUNTER — TRANSFERRED RECORDS (OUTPATIENT)
Dept: HEALTH INFORMATION MANAGEMENT | Facility: CLINIC | Age: 67
End: 2022-02-23
Payer: COMMERCIAL

## 2022-02-23 LAB — RETINOPATHY: NEGATIVE

## 2022-04-29 ENCOUNTER — OFFICE VISIT (OUTPATIENT)
Dept: INTERNAL MEDICINE | Facility: CLINIC | Age: 67
End: 2022-04-29
Payer: COMMERCIAL

## 2022-04-29 VITALS
BODY MASS INDEX: 32.02 KG/M2 | SYSTOLIC BLOOD PRESSURE: 136 MMHG | OXYGEN SATURATION: 97 % | WEIGHT: 204 LBS | DIASTOLIC BLOOD PRESSURE: 82 MMHG | HEART RATE: 67 BPM | HEIGHT: 67 IN

## 2022-04-29 DIAGNOSIS — Z23 NEED FOR VACCINATION WITH 13-POLYVALENT PNEUMOCOCCAL CONJUGATE VACCINE: ICD-10-CM

## 2022-04-29 DIAGNOSIS — Z11.59 NEED FOR HEPATITIS C SCREENING TEST: ICD-10-CM

## 2022-04-29 DIAGNOSIS — Z51.81 ENCOUNTER FOR THERAPEUTIC DRUG MONITORING: ICD-10-CM

## 2022-04-29 DIAGNOSIS — Z00.00 ENCOUNTER FOR WELLNESS EXAMINATION IN ADULT: Primary | ICD-10-CM

## 2022-04-29 DIAGNOSIS — Z82.49 FAMILY HISTORY OF CORONARY ARTERY DISEASE IN FATHER: ICD-10-CM

## 2022-04-29 DIAGNOSIS — E55.9 VITAMIN D DEFICIENCY: ICD-10-CM

## 2022-04-29 DIAGNOSIS — Z86.0100 HISTORY OF COLONIC POLYPS: ICD-10-CM

## 2022-04-29 DIAGNOSIS — M79.671 CHRONIC HEEL PAIN, RIGHT: ICD-10-CM

## 2022-04-29 DIAGNOSIS — E11.9 TYPE 2 DIABETES MELLITUS WITHOUT COMPLICATION, WITHOUT LONG-TERM CURRENT USE OF INSULIN (H): ICD-10-CM

## 2022-04-29 DIAGNOSIS — G89.29 CHRONIC HEEL PAIN, RIGHT: ICD-10-CM

## 2022-04-29 DIAGNOSIS — Z23 NEED FOR VACCINE FOR DT (DIPHTHERIA-TETANUS): ICD-10-CM

## 2022-04-29 DIAGNOSIS — Z23 NEED FOR COVID-19 VACCINE: ICD-10-CM

## 2022-04-29 DIAGNOSIS — E78.00 HYPERCHOLESTEROLEMIA: ICD-10-CM

## 2022-04-29 LAB
ALBUMIN SERPL-MCNC: 4.1 G/DL (ref 3.5–5)
ALP SERPL-CCNC: 78 U/L (ref 45–120)
ALT SERPL W P-5'-P-CCNC: 36 U/L (ref 0–45)
ANION GAP SERPL CALCULATED.3IONS-SCNC: 14 MMOL/L (ref 5–18)
AST SERPL W P-5'-P-CCNC: 31 U/L (ref 0–40)
BILIRUB SERPL-MCNC: 1.5 MG/DL (ref 0–1)
BUN SERPL-MCNC: 14 MG/DL (ref 8–22)
CALCIUM SERPL-MCNC: 9.8 MG/DL (ref 8.5–10.5)
CHLORIDE BLD-SCNC: 105 MMOL/L (ref 98–107)
CHOLEST SERPL-MCNC: 144 MG/DL
CO2 SERPL-SCNC: 23 MMOL/L (ref 22–31)
CREAT SERPL-MCNC: 0.66 MG/DL (ref 0.6–1.1)
CREAT UR-MCNC: 142 MG/DL
DEPRECATED CALCIDIOL+CALCIFEROL SERPL-MC: 53 UG/L (ref 20–75)
ERYTHROCYTE [DISTWIDTH] IN BLOOD BY AUTOMATED COUNT: 11.7 % (ref 10–15)
FASTING STATUS PATIENT QL REPORTED: YES
GFR SERPL CREATININE-BSD FRML MDRD: >90 ML/MIN/1.73M2
GLUCOSE BLD-MCNC: 129 MG/DL (ref 70–125)
HBA1C MFR BLD: 9.6 % (ref 0–5.6)
HCT VFR BLD AUTO: 42.4 % (ref 35–47)
HCV AB SERPL QL IA: NONREACTIVE
HDLC SERPL-MCNC: 52 MG/DL
HGB BLD-MCNC: 14.4 G/DL (ref 11.7–15.7)
LDLC SERPL CALC-MCNC: 62 MG/DL
MCH RBC QN AUTO: 30.5 PG (ref 26.5–33)
MCHC RBC AUTO-ENTMCNC: 34 G/DL (ref 31.5–36.5)
MCV RBC AUTO: 90 FL (ref 78–100)
MICROALBUMIN UR-MCNC: 11.6 MG/DL (ref 0–1.99)
MICROALBUMIN/CREAT UR: 81.7 MG/G CR
PLATELET # BLD AUTO: 188 10E3/UL (ref 150–450)
POTASSIUM BLD-SCNC: 4 MMOL/L (ref 3.5–5)
PROT SERPL-MCNC: 7.2 G/DL (ref 6–8)
RBC # BLD AUTO: 4.72 10E6/UL (ref 3.8–5.2)
SODIUM SERPL-SCNC: 142 MMOL/L (ref 136–145)
TRIGL SERPL-MCNC: 148 MG/DL
WBC # BLD AUTO: 7.7 10E3/UL (ref 4–11)

## 2022-04-29 PROCEDURE — 80061 LIPID PANEL: CPT | Performed by: INTERNAL MEDICINE

## 2022-04-29 PROCEDURE — 80053 COMPREHEN METABOLIC PANEL: CPT | Performed by: INTERNAL MEDICINE

## 2022-04-29 PROCEDURE — G0438 PPPS, INITIAL VISIT: HCPCS | Performed by: INTERNAL MEDICINE

## 2022-04-29 PROCEDURE — 82043 UR ALBUMIN QUANTITATIVE: CPT | Performed by: INTERNAL MEDICINE

## 2022-04-29 PROCEDURE — 36415 COLL VENOUS BLD VENIPUNCTURE: CPT | Performed by: INTERNAL MEDICINE

## 2022-04-29 PROCEDURE — 86803 HEPATITIS C AB TEST: CPT | Performed by: INTERNAL MEDICINE

## 2022-04-29 PROCEDURE — 82306 VITAMIN D 25 HYDROXY: CPT | Performed by: INTERNAL MEDICINE

## 2022-04-29 PROCEDURE — 85027 COMPLETE CBC AUTOMATED: CPT | Performed by: INTERNAL MEDICINE

## 2022-04-29 PROCEDURE — 99214 OFFICE O/P EST MOD 30 MIN: CPT | Mod: 25 | Performed by: INTERNAL MEDICINE

## 2022-04-29 PROCEDURE — 83036 HEMOGLOBIN GLYCOSYLATED A1C: CPT | Performed by: INTERNAL MEDICINE

## 2022-04-29 RX ORDER — CYANOCOBALAMIN (VITAMIN B-12) 500 MCG
400 LOZENGE ORAL DAILY
COMMUNITY
End: 2024-04-19

## 2022-04-29 ASSESSMENT — ACTIVITIES OF DAILY LIVING (ADL): CURRENT_FUNCTION: NO ASSISTANCE NEEDED

## 2022-04-29 NOTE — PATIENT INSTRUCTIONS
I do recommend a COVID-19 booster shot for you.  That can be obtained at local pharmacy or through  [facility], scheduled on your [employer] portal.    You are also due for a Prevnar 13 pneumonia vaccine.    You are due for a tetanus vaccine.    Get heel inserts for your shoes.  Always wear good orthotic shoes.  Do not walk barefoot.  See the podiatrist to discuss custom orthotics if needed.    Improve diabetic diet as planned.  Increase regular walking as much as able.  Consider an exercise bike for exercise if you are unable to walk because of your foot pain.    No change in medication at this time.  You could consider higher dose metformin help control diabetes, if needed.    Goal blood pressure less than 135/85.  You could consider an increase in lisinopril dose if needed.  If your urine microalbumin level is significantly elevated, I would also have you consider increasing the lisinopril dose.    Your yearly mammogram is due in November.    Your colonoscopy is due May 2024.

## 2022-04-29 NOTE — PROGRESS NOTES
SUBJECTIVE:   Helen Chavez is a 67 year old female who presents for Preventive Visit.  Patient lives independently with  but  has been diagnosed with a progressive memory disorder/dementia.  She also had her best friend die recently.  He denies depression, but does admit she has had some stress eating and not following her diet recently.  Her blood sugars have been in the 140s.  Hemoglobin A1c was 6.8% last December.    She is on metformin  mg once a day.  She had diarrhea issues on the 1000 mg a day.    On Ozempic 1 mg daily.  She does admit she is missed a couple of doses earlier this spring.  She tolerates Ozempic well.  No history of epigastric pain or pancreatitis.    February 2022 eye exam negative retinopathy and denies any vision changes.    No foot sores or neuropathy.    She did have a wart removed from her right heel many years ago and has some scar there.    Has been having some increased right heel pain when standing.  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  She does not have orthotics at this time.  The pain is on the plantar surface of her heel with some pain on the lateral aspects of her heel as well.  Is not a radicular shooting pain.  No claudication.  No leg swelling.  No foot injury identified.    She has not checked her blood pressure.  Blood pressure borderline high today.  Last December was 150/90.  He does continue on lisinopril 2.5 mg a day.  No orthostasis.    Strong family history of coronary disease with her father having heart attack in his 40s.    Patient has not had chest pain or cardiac event.  She is on Crestor 10 mg a day without severe diffuse myalgias.  No epigastric pain or history of ulcer with aspirin 81 mg.    May 2021 cholesterol well controlled with an LDL of 58.    She does have a history of fatty liver with elevated liver test in 2019 but liver tests were normal last year.  No history of significant alcohol.    No right upper  "quadrant pain or jaundice.    She has not gone back to the gym, not going to [employer] sneakers.    No chest pain chest pressure or palpitations or exertional symptoms.  No new cough or shortness of breath.    Never smoked.    No mouth sores or swallowing problems.    DEXA scan October 2021 with a spine score -0.7.  Femur score -1.8/-1.8 with no fracture history.  Vitamin D deficiency on 10,000 IU daily.  Vitamin D level was 62 last December.    Did not want breast cancer, no new breast lumps.  November 2021 she had a negative mammogram.    Previous history of left ovarian cyst with ultrasound done last year at gynecology and she was told no further ultrasounds needed of that.  She has no vaginal bleeding issues.  She is always had negative Pap and pelvics.  Last Pap -2017 with negative HPV.  No urinary symptoms.    Bowels are regular.  No melena.  Occasional hemorrhoidal bleeding.    May 2021 colonoscopy with 4 polyps and a 3-year follow-up plan.    Patient's granddaughter is having her first communion tomorrow and she does not want to get immunizations today.    Patient has been advised of split billing requirements and indicates understanding: Yes  Are you in the first 12 months of your Medicare coverage?  No    Healthy Habits:     In general, how would you rate your overall health?  Good    Frequency of exercise:  1 day/week    Duration of exercise:  Less than 15 minutes    Do you usually eat at least 4 servings of fruit and vegetables a day, include whole grains    & fiber and avoid regularly eating high fat or \"junk\" foods?  Yes    Taking medications regularly:  Yes    Ability to successfully perform activities of daily living:  No assistance needed    Home Safety:  No safety concerns identified    Hearing Impairment:  Difficulty following a conversation in a noisy restaurant or crowded room    In the past 6 months, have you been bothered by leaking of urine? Yes    In general, how would you rate your overall " mental or emotional health?  Good      PHQ-2 Total Score: 1    Additional concerns today:  Yes    Do you feel safe in your environment? Yes    Have you ever done Advance Care Planning? (For example, a Health Directive, POLST, or a discussion with a medical provider or your loved ones about your wishes): Yes, advance care planning is on file.    Fall risk  Fallen 2 or more times in the past year?: No  Any fall with injury in the past year?: No  None  Cognitive Screening   1) Repeat 3 items (Leader, Season, Table)    2) Clock draw: NORMAL  3) 3 item recall: Recalls 3 objects  Results: 3 items recalled: COGNITIVE IMPAIRMENT LESS LIKELY    Mini-CogTM Copyright S Adriana. Licensed by the author for use in [facility]; reprinted with permission ([email or web address]@[email or web address]). All rights reserved.      Do you have sleep apnea, excessive snoring or daytime drowsiness?: no    Reviewed and updated as needed this visit by clinical staff   Tobacco  Allergies  Meds                Reviewed and updated as needed this visit by Provider                   Social History     Tobacco Use     Smoking status: Former Smoker     Packs/day: 0.00     Years: 10.00     Pack years: 0.00     Types: Cigarettes, Cigarettes     Start date: 1972     Quit date: 1982     Years since quittin.3     Smokeless tobacco: Never Used   Substance Use Topics     Alcohol use: Not Currently     If you drink alcohol do you typically have >3 drinks per day or >7 drinks per week? No    Alcohol Use 2022   Prescreen: >3 drinks/day or >7 drinks/week? No   No flowsheet data found.      Current providers sharing in care for this patient include:   Patient Care Team:  Olvin Nguyen MD as PCP - General (Internal Medicine)  Velia Cuellar, PharmD as Pharmacist (Pharmacist)  Olvin Nguyen MD as Assigned PCP    The following health maintenance items are reviewed in Epic and correct as of today:  Health Maintenance Due   Topic Date Due     DIABETIC FOOT EXAM   "Never done     ANNUAL REVIEW OF HM ORDERS  Never done     ADVANCE CARE PLANNING  Never done     Pneumococcal Vaccine: 65+ Years (1 - PCV) Never done     HEPATITIS C SCREENING  Never done     ZOSTER IMMUNIZATION (2 of 2) 2016     LIPID  2022     MICROALBUMIN  2022     Lab work is in process  Patient Active Problem List   Diagnosis     DM2 (diabetes mellitus, type 2) (H)     HLD (hyperlipidemia)     Microalbuminuria     Past Surgical History:   Procedure Laterality Date     ARTHROSCOPY KNEE PROCEDURE CARTILAGE (GENZYME) Left      BIOPSY BREAST Right 1970    benign lump removed     EXCISE GANGLION WRIST       PILONIDAL CYST / SINUS EXCISION         Social History     Tobacco Use     Smoking status: Former Smoker     Packs/day: 0.00     Years: 10.00     Pack years: 0.00     Types: Cigarettes, Cigarettes     Start date: 1972     Quit date: 1982     Years since quittin.3     Smokeless tobacco: Never Used   Substance Use Topics     Alcohol use: Not Currently     Family History   Problem Relation Age of Onset     Breast Cancer Paternal Aunt 77.00         Current Outpatient Medications   Medication Sig Dispense Refill     aspirin 81 MG EC tablet [ASPIRIN 81 MG EC TABLET] Take 1 tablet (81 mg total) by mouth daily.  0     BD MELINA 2ND GEN PEN NEEDLE 32 gauge x 5/32\" Ndle [BD MELINA 2ND GEN PEN NEEDLE 32 GAUGE X 5/32\" NDLE] USE ONCE WEEKLY WITH OZEMPIC. 10 each 6     blood glucose test (ACCU-CHEK SMARTVIEW TEST STRIP) strips [BLOOD GLUCOSE TEST (ACCU-CHEK SMARTVIEW TEST STRIP) STRIPS] Use 1 each As Directed daily. 90 strip 3     clobetasol (TEMOVATE) 0.05 % external ointment clobetasol 0.05 % topical ointment   APPLY A THIN LAYER TO AFFECTED AREA(S) TWO TIMES A DAY       lancets 25 gauge Misc [LANCETS 25 GAUGE MISC] Use 1 each As Directed 2 (two) times a day.       lisinopril (ZESTRIL) 2.5 MG tablet Take 1 tablet (2.5 mg) by mouth daily 90 tablet 3     metFORMIN (GLUCOPHAGE-XR) 500 MG 24 hr tablet " "Take 1 tablet (500 mg) by mouth daily 90 tablet 3     rosuvastatin (CRESTOR) 10 MG tablet Take 1 tablet (10 mg) by mouth At Bedtime 90 tablet 3     semaglutide (OZEMPIC) 1 mg/dose (2 mg/1.5 mL) PnIj [SEMAGLUTIDE (OZEMPIC) 1 MG/DOSE (2 MG/1.5 ML) PNIJ] Inject 1 mg under the skin once a week. Inject 1mg under the skin once weekly 3 mL 2     vitamin (B COMPLEX-C) tablet Take 1 tablet by mouth daily       Vitamin D3 (CHOLECALCIFEROL) 125 MCG (5000 UT) tablet Take 2 tablets by mouth daily       vitamin E 400 units TABS Take 400 Units by mouth daily       Allergies   Allergen Reactions     Jardiance [Empagliflozin] Other (See Comments)     Yeast infection, constipation     Losartan Muscle Pain (Myalgia)     Muscle cramps     Penicillins Hives       Any new diagnosis of family breast, ovarian, or bowel cancer? No  Did have an aunt with breast cancer previously.  FHS-7:   Breast CA Risk Assessment (FHS-7) 11/17/2021   Did any of your first-degree relatives have breast or ovarian cancer? No   Did any of your relatives have bilateral breast cancer? No   Did any man in your family have breast cancer? No   Did any woman in your family have breast and ovarian cancer? No   Did any woman in your family have breast cancer before age 50 y? No   Do you have 2 or more relatives with breast and/or ovarian cancer? No     click delete button to remove this line now  November 2021 she had a negative mammogram.  Continue yearly mammograms.  Pertinent mammograms are reviewed under the imaging tab.    Review of Systems  Constitutional, HEENT, cardiovascular, pulmonary, GI, , musculoskeletal, neuro, skin, endocrine and psych systems are negative, except as otherwise noted.    OBJECTIVE:   /82 (BP Location: Right arm, Patient Position: Sitting, Cuff Size: Adult Large)   Pulse 67   Ht 1.689 m (5' 6.5\")   Wt 92.5 kg (204 lb)   SpO2 97%   BMI 32.43 kg/m   Estimated body mass index is 32.43 kg/m  as calculated from the following:    " "Height as of this encounter: 1.689 m (5' 6.5\").    Weight as of this encounter: 92.5 kg (204 lb).  Physical Exam  Mildly overweight female.  Alert and oriented x3 with mildly anxious affect.  She did not have a flat or withdrawn affect.  Normal cognition.  Clock face drawing normal and word recall normal.  Mobility exam is normal.  Pupils and irises equal and reactive.  Extraocular muscles intact.  No jaundice or conjunctivitis.  External ears and nose exam is normal.  Normal tympanic membranes.  No cervical or supraclavicular or axillary adenopathy.  No JVD and no carotid bruits.  Thyromegaly or nodularity.  Lungs clear to auscultation with good respiratory excursion.  Heart regular with no murmur rub or gallop.  No ankle edema.  +1 pedal pulses bilaterally.  Feet do show some mild collapse of her right ankle with eschar on her right heel consistent with previous wart treatment.  I do not feel any bones perforators no ulcer or abnormal callus on her heel.  There is not tenderness along her Achilles tendon or insertion site on the posterior heel.  Left foot is within normal limits just a slight callus on her left great toe.  He does have bony abnormality on her left foot from previous fracture.  Abdomen is obese but without significant tenderness.  Liver edge was approximately 1 cm below the costal margin palpable.  No splenomegaly.  No pulsatile abdominal mass.  She declined breast exam.  Seborrheic keratosis on her left posterior back, no other suspicious skin lesions noted.  ASSESSMENT / PLAN:   (Z00.00) Encounter for wellness examination in adult  (primary encounter diagnosis)  Comment: Patient's main issue is her diabetes and cardiovascular risk factors, and emotional strain with her  with a progressive cognitive disorder.    I stressed the importance of getting back on track with her diet and exercise plan.  Rest her right heel pain to improve mobility.  Plan: REVIEW OF HEALTH MAINTENANCE PROTOCOL " ORDERS        She did confirm full CODE STATUS wishes.    Patient has seen gynecology in the past but was told as of last year she did not need further follow-up on the ovarian cyst.  Negative HPV and Paps previously, does not need further Pap smears.  She was told if any vaginal bleeding or spotting that would need to be evaluated right away.    Continue yearly mammograms in November    Increase situational stress, but she denies depression and declined referral to mental health services.    (E11.9) Type 2 diabetes mellitus without complication, without long-term current use of insulin (H)  Comment: Has been well controlled.  Does not tolerate higher doses of metformin, although we did discuss possibly increasing metformin to 750 mg a day to see if she can tolerate that.    Continue current Ozempic, but she has missed doses with stress in her life, has been in the hospital recently.    Improved compliance with Ozempic, improved diet and exercise.    She was offered a diabetic education referral, but she declined.  Plan: Hemoglobin A1c, Albumin Random Urine         Quantitative with Creat Ratio        Follow-up in 3 to 4 months.  She prefers to work on diet and exercise before increasing medication first.    (E78.00) Hypercholesterolemia  Comment: Crestor 10 mg.  Strong family history of coronary disease and diabetes.  Goal LDL at least less than 100.  Plan: Lipid Profile        No evidence of toxicity.  Continue aspirin daily given her risk factors    (Z82.49) Family history of coronary artery disease in father  Comment: As above, no event  Plan:     (Z11.59) Need for hepatitis C screening test  Comment:   Plan: Hepatitis C Screen Reflex to HCV RNA Quant and         Genotype        History of elevated LFTs in 2019 consistent with fatty liver        (Z86.010) History of colonic polyps  Comment: 3-year colonoscopy due May 2024  Plan: Mild hemorrhoidal bleeding intermittent    (E55.9) Vitamin D deficiency  Comment: On  "10,000 IU a day  Plan: Vitamin D Deficiency        Adjust dose if needed    (Z51.81) Encounter for therapeutic drug monitoring  Comment:   Plan: CBC with platelets, Comprehensive metabolic         panel            (Z23) Need for vaccination with 13-polyvalent pneumococcal conjugate vaccine  Comment: Discussed need for vaccines, but she declined vaccines today  Plan:    (Z23) Need for vaccine for DT (diphtheria-tetanus)  Comment: As above  Plan:     (Z23) Need for COVID-19 vaccine  Comment: As above  Plan:     (M79.671,  G89.29) Chronic heel pain, right  Comment: Scar tissue on her right heel from previous wart removal.  She does have some collapse of her ankle, likely some chronic DJD.  Previous left foot fracture.  Obesity contributing to foot pain.    Refer to podiatry to evaluate for custom orthotics.  She will try over-the-counter orthotics at this time, wear good orthotic shoes at all times.  I did recommend some gentle Stretching exercises or gentle tennis ball massage of her feet  Plan: Orthopedic  Referral              Patient has been advised of split billing requirements and indicates understanding: Yes    COUNSELING:  Reviewed preventive health counseling, as reflected in patient instructions       Regular exercise       Healthy diet/nutrition       Vision screening       Aspirin prophylaxis        Colon cancer screening    Estimated body mass index is 32.43 kg/m  as calculated from the following:    Height as of this encounter: 1.689 m (5' 6.5\").    Weight as of this encounter: 92.5 kg (204 lb).    Weight management plan: Discussed healthy diet and exercise guidelines    She reports that she quit smoking about 40 years ago. Her smoking use included cigarettes and cigarettes. She started smoking about 50 years ago. She smoked 0.00 packs per day for 10.00 years. She has never used smokeless tobacco.      Appropriate preventive services were discussed with this patient, including applicable " screening as appropriate for cardiovascular disease, diabetes, osteopenia/osteoporosis, and glaucoma.  As appropriate for age/gender, discussed screening for colorectal cancer, prostate cancer, breast cancer, and cervical cancer. Checklist reviewing preventive services available has been given to the patient.    Reviewed patients plan of care and provided an AVS. The Basic Care Plan (routine screening as documented in Health Maintenance) for Helen meets the Care Plan requirement. This Care Plan has been established and reviewed with the Patient.    Counseling Resources:  ATP IV Guidelines  Pooled Cohorts Equation Calculator  Breast Cancer Risk Calculator  Breast Cancer: Medication to Reduce Risk  FRAX Risk Assessment  ICSI Preventive Guidelines  Dietary Guidelines for Americans, 2010  USDA's MyPlate  ASA Prophylaxis  Lung CA Screening    Olvin Nguyen MD  [facility]    Identified Health Risks:

## 2022-05-12 DIAGNOSIS — E78.00 HYPERCHOLESTEROLEMIA: ICD-10-CM

## 2022-05-12 RX ORDER — ROSUVASTATIN CALCIUM 10 MG/1
10 TABLET, COATED ORAL AT BEDTIME
Qty: 90 TABLET | Refills: 3 | Status: SHIPPED | OUTPATIENT
Start: 2022-05-12 | End: 2023-03-02

## 2022-05-12 NOTE — TELEPHONE ENCOUNTER
Refill Request  Medication name: Pending Prescriptions:                       Disp   Refills    rosuvastatin (CRESTOR) 10 MG tablet       90 tab*3            Sig: Take 1 tablet (10 mg) by mouth At Bedtime    Who prescribed the medication: Patrick  Last refill on medication: 02/08/22  [facility]ed [facility]  Last appointment with PCP: 04/29/22  Next appointment: Appointment scheduled for 07/29/22

## 2022-05-18 ENCOUNTER — OFFICE VISIT (OUTPATIENT)
Dept: PODIATRY | Facility: CLINIC | Age: 67
End: 2022-05-18
Attending: INTERNAL MEDICINE
Payer: COMMERCIAL

## 2022-05-18 VITALS
WEIGHT: 197 LBS | BODY MASS INDEX: 30.92 KG/M2 | OXYGEN SATURATION: 97 % | HEART RATE: 74 BPM | TEMPERATURE: 98.3 F | HEIGHT: 67 IN | RESPIRATION RATE: 16 BRPM

## 2022-05-18 DIAGNOSIS — M72.2 PLANTAR FASCIITIS: Primary | ICD-10-CM

## 2022-05-18 PROCEDURE — 99203 OFFICE O/P NEW LOW 30 MIN: CPT | Mod: 25 | Performed by: PODIATRIST

## 2022-05-18 PROCEDURE — 20550 NJX 1 TENDON SHEATH/LIGAMENT: CPT | Mod: RT | Performed by: PODIATRIST

## 2022-05-18 RX ORDER — DEXAMETHASONE SODIUM PHOSPHATE 4 MG/ML
4 INJECTION, SOLUTION INTRA-ARTICULAR; INTRALESIONAL; INTRAMUSCULAR; INTRAVENOUS; SOFT TISSUE ONCE
Status: COMPLETED | OUTPATIENT
Start: 2022-05-18 | End: 2022-05-18

## 2022-05-18 RX ORDER — LIDOCAINE HYDROCHLORIDE 20 MG/ML
1 INJECTION, SOLUTION INFILTRATION; PERINEURAL ONCE
Status: COMPLETED | OUTPATIENT
Start: 2022-05-18 | End: 2022-05-18

## 2022-05-18 RX ADMIN — LIDOCAINE HYDROCHLORIDE 1 ML: 20 INJECTION, SOLUTION INFILTRATION; PERINEURAL at 09:33

## 2022-05-18 RX ADMIN — DEXAMETHASONE SODIUM PHOSPHATE 4 MG: 4 INJECTION, SOLUTION INTRA-ARTICULAR; INTRALESIONAL; INTRAMUSCULAR; INTRAVENOUS; SOFT TISSUE at 09:33

## 2022-05-18 ASSESSMENT — PAIN SCALES - GENERAL: PAINLEVEL: MODERATE PAIN (4)
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
## 2022-05-18 NOTE — PATIENT INSTRUCTIONS
What are Prescription Custom Orthotics?  Custom orthotics are specially-made devices designed to support and comfort your feet. Prescription orthotics are crafted for you and no one else. They match the contours of your feet precisely and are designed for the way you move. Orthotics are only manufactured after a podiatrist has conducted a complete evaluation of your feet, ankles, and legs, so the orthotic can accommodate your unique foot structure and pathology.  Prescription orthotics are divided into two categories:  Functional orthotics are designed to control abnormal motion. They may be used to treat foot pain caused by abnormal motion; they can also be used to treat injuries such as shin splints or tendinitis. Functional orthotics are usually crafted of a semi-rigid material such as plastic or graphite.  Accommodative orthotics are softer and meant to provide additional cushioning and support. They can be used to treat diabetic foot ulcers, painful calluses on the bottom of the foot, and other uncomfortable conditions.  Podiatrists use orthotics to treat foot problems such as plantar fasciitis, bursitis, tendinitis, diabetic foot ulcers, and foot, ankle, and heel pain. Clinical research studies have shown that podiatrist-prescribed foot orthotics decrease foot pain and improve function.  Orthotics typically cost more than shoe inserts purchased in a retail store, but the additional cost is usually well worth it. Unlike shoe inserts, orthotics are molded to fit each individual foot, so you can be sure that your orthotics fit and do what they're supposed to do. Prescription orthotics are also made of top-notch materials and last many years when cared for properly. Insurance often helps pay for prescription orthotics.  What are Shoe Inserts?   You've seen them at the grocery store and at the mall. You've probably even seen them on TV and online. Shoe inserts are any kind of non-prescription foot support designed  to be worn inside a shoe. Pre-packaged, mass produced, arch supports are shoe inserts. So are the  custom-made  insoles and foot supports that you can order online or at retail stores. Unless the device has been prescribed by a doctor and crafted for your specific foot, it's a shoe insert, not a custom orthotic device--despite what the ads might say.  Shoe inserts can be very helpful for a variety of foot ailments, including flat arches and foot and leg pain. They can cushion your feet, provide comfort, and support your arches, but they can't correct biomechanical foot problems or cure long-standing foot issues.  The most common types of shoe inserts are:  Arch supports: Some people have high arches. Others have low arches or flat feet. Arch supports generally have a  bumped-up  appearance and are designed to support the foot's natural arch.   Insoles: Insoles slip into your shoe to provide extra cushioning and support. Insoles are often made of gel, foam, or plastic.   Heel liners: Heel liners, sometimes called heel pads or heel cups, provide extra cushioning in the heel region. They may be especially useful for patients who have foot pain caused by age-related thinning of the heels' natural fat pads.   Foot cushions: Do your shoes rub against your heel or your toes? Foot cushions come in many different shapes and sizes and can be used as a barrier between you and your shoe.  Choosing an Over-the-Counter Shoe Insert  Selecting a shoe insert from the wide variety of devices on the market can be overwhelming. Here are some podiatrist-tested tips to help you find the insert that best meets your needs:  Consider your health. Do you have diabetes? Problems with circulation? An over-the-counter insert may not be your best bet. Diabetes and poor circulation increase your risk of foot ulcers and infections, so schedule an appointment with a podiatrist. He or she can help you select a solution that won't cause additional  health problems.   Think about the purpose. Are you planning to run a marathon, or do you just need a little arch support in your work shoes? Look for a product that fits your planned level of activity.   Bring your shoes. For the insert to be effective, it has to fit into your shoes. So bring your sneakers, dress shoes, or work boots--whatever you plan to wear with your insert. Look for an insert that will fit the contours of your shoe.   Try them on. If all possible, slip the insert into your shoe and try it out. Walk around a little. How does it feel? Don't assume that feelings of pressure will go away with continued wear. (If you can't try the inserts at the store, ask about the store's return policy and hold on to your receipt.)    Please call one of the [facility] locations below to schedule an appointment. If you received a prescription please bring it with you to your appointment. Some locations are limited to what they carry.    Office Locations    [facility]  [address] [place] [address]  Home Medical Equipment, [address]   Phone: [phone]   Orthotics and Prosthetics, [address]   Phone: [phone]    [facility]  [address]  [address]   [place] [address]   Phone: [phone]    [facility]  [address]  [place] [address]  Phone: [phone]    [facility]   [address]  [place] [address]  Phone: [phone]    [facility]  [address]  [place] [address]  Phone: [phone]    [facility]  [address]  [place] [address]  Phone: [phone]    [place]   [address] [place] [address]   Phone: [phone]    WEARING YOUR CUSTOM FOOT ORTHOTICS   Most  insurance plans cover one pair of orthotics per year. You must check with your   insurance plan to see what your payment responsibility will be. Please call your   insurance company by calling the number on the back of your insurance card.   Orthotic's are non-refundable and non-returnable.   Orthotics are made of various designs. Some orthotics are covered with material that extends beyond your toes. If your orthotic is of this design, you will likely need to trim the toe end to get a proper fit. The insole from your shoe can be used as a template. Simply overlay the shoe insert on top of the custom orthotic. Align the heel end while tracing the length of the insert onto the custom orthotic. Use a large scissor to trim the toe end until you get a proper fit in the shoe.   The orthotic needs to be pushed as far back in the shoe as possible. The heel portion should not ride forward so as not to irritate your heel.   Orthotics are designed to work with socks. Excessive perspiration will shorten the life span of the orthotics. Remove the orthotic from the shoe frequently for proper drying.   The break-in period lasts for weeks. People new to orthotics will likely experience new aches and pains. The orthotic is forcing your foot into a new position. Arch, foot and leg muscle aches and fatigue are common during these weeks. Minor discomfort can be considered normal break in phenomenon. Start wearing your orthotic around your home your first day. Limited activity for one to two hours is recommended. You can increase one or two additional hours each day provided the aches and pains are subsiding. The degree of discomfort, fatigue and problems will dictate the speed of break in. You may require multiple weeks to work up to full time use.   Do not continue wearing your orthotics if they are creating problems such as blisters or sores. Do not hesitate to call the clinic to speak with a nurse regarding orthotic   break in,  fit, trimming, etc. You may also need to see the doctor if the orthotics are   simply not working out. Adjustments are sometimes made to improve orthotic   function.     Orthotics will only work in certain styles and types of shoes. Orthotics rarely work in dress shoes. Slip-ons, clogs, sandals and heels are particularly troublesome. Specially designed orthotics may be necessary for these types of shoes. Your custom orthotic was designed for activities that require appropriate walking or running shoes. Lace up athletic shoes, walking shoes or work boots should work appropriately. You may need a wider or longer shoe. Shoes with a removable  or insert work best. In general, you want to remove an insert from the shoe before placing the orthotic into the shoe. Shoes without a removable liner may not work as well.     When purchasing new shoes, bring your orthotics along to get a proper fit. Shop at stores that are familiar with orthotics.   Frequent washing of the orthotic may shorten the life span of the top cover. The top cover can be replaced but will generally last one to five years depending on use and foot perspiration.

## 2022-05-24 ENCOUNTER — TELEPHONE (OUTPATIENT)
Dept: INTERNAL MEDICINE | Facility: CLINIC | Age: 67
End: 2022-05-24
Payer: COMMERCIAL

## 2022-05-24 DIAGNOSIS — E11.9 TYPE 2 DIABETES MELLITUS WITHOUT COMPLICATION, WITHOUT LONG-TERM CURRENT USE OF INSULIN (H): Primary | ICD-10-CM

## 2022-05-24 NOTE — TELEPHONE ENCOUNTER
Reason for Call:  Medication or medication refill:    Do you use a [facility]?  Name of the pharmacy and phone number for the current request:  [facility]    Name of the medication requested: semaglutide (OZEMPIC) 1 mg/dose (2 mg/1.5 mL) PnIj    Other request: None    Can we leave a detailed message on this number? Not Applicable    Phone number patient can be reached at: Home number on file [phone] (home)    Best Time: any    Call taken on 5/24/2022 at 1:21 PM by Destin Velarde

## 2022-05-24 NOTE — TELEPHONE ENCOUNTER
Unable to pend this medication. Can you please refill for patient. She is here in clinic.  Scarlet Mcrae CMA ............... 1:36 PM, 05/24/22

## 2022-06-03 ENCOUNTER — TRANSFERRED RECORDS (OUTPATIENT)
Dept: HEALTH INFORMATION MANAGEMENT | Facility: CLINIC | Age: 67
End: 2022-06-03
Payer: COMMERCIAL

## 2022-08-08 DIAGNOSIS — I10 ESSENTIAL HYPERTENSION: ICD-10-CM

## 2022-08-08 RX ORDER — LISINOPRIL 2.5 MG/1
TABLET ORAL
Qty: 90 TABLET | Refills: 3 | OUTPATIENT
Start: 2022-08-08

## 2022-09-18 ENCOUNTER — HEALTH MAINTENANCE LETTER (OUTPATIENT)
Age: 67
End: 2022-09-18

## 2022-12-02 ENCOUNTER — HOSPITAL ENCOUNTER (OUTPATIENT)
Dept: MAMMOGRAPHY | Facility: CLINIC | Age: 67
Discharge: HOME | End: 2022-12-02
Attending: INTERNAL MEDICINE | Admitting: INTERNAL MEDICINE
Payer: COMMERCIAL

## 2022-12-02 DIAGNOSIS — Z12.31 VISIT FOR SCREENING MAMMOGRAM: ICD-10-CM

## 2022-12-02 PROCEDURE — 77067 SCR MAMMO BI INCL CAD: CPT

## 2023-01-25 ENCOUNTER — TRANSFERRED RECORDS (OUTPATIENT)
Dept: HEALTH INFORMATION MANAGEMENT | Facility: CLINIC | Age: 68
End: 2023-01-25

## 2023-01-25 LAB — RETINOPATHY: NEGATIVE

## 2023-01-29 ENCOUNTER — HEALTH MAINTENANCE LETTER (OUTPATIENT)
Age: 68
End: 2023-01-29

## 2023-02-08 DIAGNOSIS — I10 ESSENTIAL HYPERTENSION: ICD-10-CM

## 2023-02-09 RX ORDER — LISINOPRIL 2.5 MG/1
2.5 TABLET ORAL DAILY
Qty: 90 TABLET | Refills: 0 | Status: SHIPPED | OUTPATIENT
Start: 2023-02-09 | End: 2023-03-02

## 2023-02-09 NOTE — TELEPHONE ENCOUNTER
"Last Written Prescription Date:  12/17/21  Last Fill Quantity: 90,  # refills: 3   Last office visit provider:  4/29/22     Requested Prescriptions   Pending Prescriptions Disp Refills     lisinopril (ZESTRIL) 2.5 MG tablet 90 tablet 3     Sig: Take 1 tablet (2.5 mg) by mouth daily       ACE Inhibitors (Including Combos) Protocol Passed - 2/9/2023  1:04 PM        Passed - Blood pressure under 140/90 in past 12 months     BP Readings from Last 3 Encounters:   04/29/22 136/82   12/17/21 (!) 150/90   05/03/21 132/72                 Passed - Recent (12 mo) or future (30 days) visit within the authorizing provider's specialty     Patient has had an office visit with the authorizing provider or a provider within the authorizing providers department within the previous 12 mos or has a future within next 30 days. See \"Patient Info\" tab in inbasket, or \"Choose Columns\" in Meds & Orders section of the refill encounter.              Passed - Medication is active on med list        Passed - Patient is age 18 or older        Passed - No active pregnancy on record        Passed - Normal serum creatinine on file in past 12 months     Recent Labs   Lab Test 04/29/22  0855   CR 0.66       Ok to refill medication if creatinine is low          Passed - Normal serum potassium on file in past 12 months     Recent Labs   Lab Test 04/29/22  0855   POTASSIUM 4.0             Passed - No positive pregnancy test within past 12 months             Abhay Matute RN 02/09/23 1:04 PM  "

## 2023-02-27 DIAGNOSIS — E78.00 HYPERCHOLESTEROLEMIA: ICD-10-CM

## 2023-02-28 RX ORDER — ROSUVASTATIN CALCIUM 10 MG/1
10 TABLET, COATED ORAL AT BEDTIME
Qty: 90 TABLET | Refills: 3 | OUTPATIENT
Start: 2023-02-28

## 2023-03-02 ENCOUNTER — OFFICE VISIT (OUTPATIENT)
Dept: INTERNAL MEDICINE | Facility: CLINIC | Age: 68
End: 2023-03-02
Payer: COMMERCIAL

## 2023-03-02 VITALS
SYSTOLIC BLOOD PRESSURE: 152 MMHG | HEART RATE: 67 BPM | BODY MASS INDEX: 31.63 KG/M2 | DIASTOLIC BLOOD PRESSURE: 86 MMHG | HEIGHT: 67 IN | WEIGHT: 201.5 LBS | RESPIRATION RATE: 16 BRPM | TEMPERATURE: 98 F

## 2023-03-02 DIAGNOSIS — Z51.81 ENCOUNTER FOR THERAPEUTIC DRUG MONITORING: ICD-10-CM

## 2023-03-02 DIAGNOSIS — E11.9 TYPE 2 DIABETES MELLITUS WITHOUT COMPLICATION, WITHOUT LONG-TERM CURRENT USE OF INSULIN (H): Primary | ICD-10-CM

## 2023-03-02 DIAGNOSIS — I10 ESSENTIAL HYPERTENSION: ICD-10-CM

## 2023-03-02 DIAGNOSIS — Z86.0100 HISTORY OF COLONIC POLYPS: ICD-10-CM

## 2023-03-02 DIAGNOSIS — T75.3XXA MOTION SICKNESS, INITIAL ENCOUNTER: ICD-10-CM

## 2023-03-02 DIAGNOSIS — E78.00 HYPERCHOLESTEROLEMIA: ICD-10-CM

## 2023-03-02 LAB
ALBUMIN SERPL BCG-MCNC: 4.5 G/DL (ref 3.5–5.2)
ALP SERPL-CCNC: 87 U/L (ref 35–104)
ALT SERPL W P-5'-P-CCNC: 36 U/L (ref 10–35)
ANION GAP SERPL CALCULATED.3IONS-SCNC: 13 MMOL/L (ref 7–15)
AST SERPL W P-5'-P-CCNC: 31 U/L (ref 10–35)
BILIRUB SERPL-MCNC: 1 MG/DL
BUN SERPL-MCNC: 14.9 MG/DL (ref 8–23)
CALCIUM SERPL-MCNC: 10 MG/DL (ref 8.8–10.2)
CHLORIDE SERPL-SCNC: 102 MMOL/L (ref 98–107)
CREAT SERPL-MCNC: 0.61 MG/DL (ref 0.51–0.95)
DEPRECATED HCO3 PLAS-SCNC: 24 MMOL/L (ref 22–29)
GFR SERPL CREATININE-BSD FRML MDRD: >90 ML/MIN/1.73M2
GLUCOSE SERPL-MCNC: 113 MG/DL (ref 70–99)
HBA1C MFR BLD: 6.9 % (ref 0–5.6)
POTASSIUM SERPL-SCNC: 4.4 MMOL/L (ref 3.4–5.3)
PROT SERPL-MCNC: 7.2 G/DL (ref 6.4–8.3)
SODIUM SERPL-SCNC: 139 MMOL/L (ref 136–145)

## 2023-03-02 PROCEDURE — 80053 COMPREHEN METABOLIC PANEL: CPT | Performed by: INTERNAL MEDICINE

## 2023-03-02 PROCEDURE — 83036 HEMOGLOBIN GLYCOSYLATED A1C: CPT | Performed by: INTERNAL MEDICINE

## 2023-03-02 PROCEDURE — 99214 OFFICE O/P EST MOD 30 MIN: CPT | Performed by: INTERNAL MEDICINE

## 2023-03-02 PROCEDURE — 36415 COLL VENOUS BLD VENIPUNCTURE: CPT | Performed by: INTERNAL MEDICINE

## 2023-03-02 RX ORDER — ROSUVASTATIN CALCIUM 10 MG/1
10 TABLET, COATED ORAL AT BEDTIME
Qty: 90 TABLET | Refills: 3 | Status: SHIPPED | OUTPATIENT
Start: 2023-03-02 | End: 2024-03-19

## 2023-03-02 RX ORDER — CHLORAL HYDRATE 500 MG
2 CAPSULE ORAL DAILY
COMMUNITY
End: 2024-04-19

## 2023-03-02 RX ORDER — ONDANSETRON 4 MG/1
4 TABLET, ORALLY DISINTEGRATING ORAL EVERY 8 HOURS PRN
Qty: 10 TABLET | Refills: 0 | Status: SHIPPED | OUTPATIENT
Start: 2023-03-02 | End: 2023-09-07

## 2023-03-02 RX ORDER — LISINOPRIL 2.5 MG/1
2.5 TABLET ORAL DAILY
Qty: 90 TABLET | Refills: 3 | Status: SHIPPED | OUTPATIENT
Start: 2023-03-02 | End: 2023-05-11

## 2023-03-02 RX ORDER — METFORMIN HCL 500 MG
500 TABLET, EXTENDED RELEASE 24 HR ORAL DAILY
Qty: 90 TABLET | Refills: 3 | Status: SHIPPED | OUTPATIENT
Start: 2023-03-02 | End: 2024-04-19

## 2023-03-02 ASSESSMENT — ENCOUNTER SYMPTOMS: DIARRHEA: 1

## 2023-03-02 NOTE — PATIENT INSTRUCTIONS
Check your blood pressure.  I would recommend an Omron arm blood pressure cuff.  I would not recommend the wrist cuffs, as they are sometimes not as accurate.    Goal blood pressure less than 135/85.  If your blood pressure is running higher than that, contact me by Keli, I will recommend you increase lisinopril to 5 mg and see me in 2 to 3 weeks after that change.    I would recommend powdered psyllium fiber such as Metamucil, 2 scoops in glass of water every evening to help keep your bowels more regular.  This can reduce irritable bowel.    Your colonoscopy is due May 2024.    If your hemoglobin A1c level is still above 8%, I will have you return to clinic to discuss additional diabetes medication.    Otherwise follow-up in 4 months for an adult wellness visit and diabetes checkup.    You can use Zofran nausea medicine as needed for seasickness during your airplane travel.  It dissolves under the tongue.

## 2023-03-02 NOTE — PROGRESS NOTES
Helen M Scott   67 year old female    Date of Visit: 3/2/2023    Chief Complaint   Patient presents with     Diabetes     Diarrhea     Subjective  67-year-old female, still living independently with  who has dementia.    She has not been getting much regular exercise at home, but she did get back from [place] and did quite of walking there and had good exertional ability.  No increasing shortness of breath or chest pain.  No edema.    No worsening daytime sleepiness.  No palpitations or history of atrial fibrillation.    Family history of coronary disease with her father had a heart attack in his 40s.    Patient had excellent cholesterol level last April on rosuvastatin 10 mg.  No generalized myalgias or jaundice.    History of fatty liver but her liver tests have been normal recently including last April.  Negative hepatitis C test April 2022.  No significant alcohol use.    Non-smoker.    She has not checked her blood pressure recently.  Blood pressure was again high today.  She is on lisinopril 2.5 mg a day.    Her urine microalbumin level was 81 April 2022 with a creatinine of 0.66.    April 2022 her hemoglobin A1c was 9.6%.  She has checked her blood pressure occasionally recently and its been in the 130s.  Denies polyuria or polydipsia.  She feels her diabetes is under control.    She is compliant with her metformin 500 mg a day.  She has had diarrhea that has limited increasing that dose.    She does have more irritable bowel symptom complaints with intermittent constipation and loose stools and occasional bowel explosions.  No blood in stool just occasional hemorrhoids.    She had multiple polyps May 2021 colonoscopy with a 3-year follow-up due next year.    No abdominal pain complaints.    No epigastric pain or melena, on aspirin daily.    He just saw ophthalmology last month and reports no retinopathy.    December 2022 she had a negative mammogram.    She has significant nausea with  "motion sickness and is requesting nausea medicine for airplane flight to [place] coming up.    No cough or respiratory symptoms.  She refuses all vaccines.  PMHx:    Past Medical History:   Diagnosis Date     Diabetes mellitus (H)      Elevated LFTs      History of anesthesia complications     wild after surgery age 15     Hyperlipidemia      Hypertension      Menopause      Microalbuminuria      Warts of foot     right     PSHx:    Past Surgical History:   Procedure Laterality Date     ARTHROSCOPY KNEE PROCEDURE CARTILAGE (GENZYME) Left      BIOPSY BREAST Right 1970    benign lump removed     EXCISE GANGLION WRIST       PILONIDAL CYST / SINUS EXCISION       Immunizations:   Immunization History   Administered Date(s) Administered     COVID-19 Vaccine 12+ (Pfizer) 11/12/2021     COVID-19 Vaccine 18+ (Moderna) 02/06/2021, 03/06/2021     FLUAD(HD)65+ QUAD 10/12/2020, 10/14/2021     Influenza (IIV3) PF 12/11/2003     Tdap (Adacel,Boostrix) 08/20/2007       ROS A comprehensive review of systems was performed and was otherwise negative    Medications, allergies, and problem list were reviewed and updated    Exam  BP (!) 152/86   Pulse 67   Temp 98  F (36.7  C) (Oral)   Resp 16   Ht 1.689 m (5' 6.5\")   Wt 91.4 kg (201 lb 8 oz)   BMI 32.04 kg/m    Mildly overweight female.  Alert and oriented.  Initial blood pressure was 134/88 but higher on my recheck.    No jaundice.  Lungs are clear.  Heart is regular without murmur.  No carotid bruits.  Abdomen is nontender.  No ankle edema.  Feet were examined, no preulcerative calluses and fine filament sensation intact.  +1 pedal pulses, skin in good condition of feet    Assessment/Plan  1. Type 2 diabetes mellitus without complication, without long-term current use of insulin (H)  Unclear control, but recent blood sugars have been okay.  Last year she was out of control.    She has some mild paresthesias on her feet but no evidence of gross neuropathy at this time.    I " stressed the importance of regular activity with walking, try to walk more in the house if able.    She is limited on her metformin dose with history of diarrhea and irritable bowel symptoms.    Tolerating Ozempic without significant nausea.  No history of pancreatitis.    Could consider adding Actos.    Apparently she got yeast infections and constipation with Jardiance in the past.    Could also consider increase of Ozempic in the future.    At this time, focus on improving diet and exercise.  - HEMOGLOBIN A1C  - Semaglutide, 1 MG/DOSE, (OZEMPIC) 4 MG/3ML pen; Inject 1 mg Subcutaneous once a week  Dispense: 3 mL; Refill: 11  - metFORMIN (GLUCOPHAGE XR) 500 MG 24 hr tablet; Take 1 tablet (500 mg) by mouth daily  Dispense: 90 tablet; Refill: 3    2. Essential hypertension  Not controlled.  She does wish to check her blood pressure on her own before increasing lisinopril.    A history of elevated urine microalbumin, I would recommend increase of lisinopril if her blood pressure is running above 135/85.  She will check his blood pressure over the next month and contact me via [employer], see patient instructions  - lisinopril (ZESTRIL) 2.5 MG tablet; Take 1 tablet (2.5 mg) by mouth daily  Dispense: 90 tablet; Refill: 3    3. Hypercholesterolemia  Goal LDL less than 100, well controlled last April  - rosuvastatin (CRESTOR) 10 MG tablet; Take 1 tablet (10 mg) by mouth At Bedtime  Dispense: 90 tablet; Refill: 3    4. Encounter for therapeutic drug monitoring    - Comprehensive metabolic panel    5. History of colonic polyps  Irritable bowel symptoms, recommended daily fiber.    Colonoscopy due May 2024    6. Motion sickness, initial encounter  As needed Zofran, did discuss scopolamine patch and Dramamine as other options.  - ondansetron (ZOFRAN ODT) 4 MG ODT tab; Take 1 tablet (4 mg) by mouth every 8 hours as needed for nausea  Dispense: 10 tablet; Refill: 0    Yearly mammogram in December    Declines all vaccinations     "  Return in about 4 months (around 7/2/2023) for Routine preventive.   Patient Instructions   Check your blood pressure.  I would recommend an Omron arm blood pressure cuff.  I would not recommend the wrist cuffs, as they are sometimes not as accurate.    Goal blood pressure less than 135/85.  If your blood pressure is running higher than that, contact me by Keli, I will recommend you increase lisinopril to 5 mg and see me in 2 to 3 weeks after that change.    I would recommend powdered psyllium fiber such as Metamucil, 2 scoops in glass of water every evening to help keep your bowels more regular.  This can reduce irritable bowel.    Your colonoscopy is due May 2024.    If your hemoglobin A1c level is still above 8%, I will have you return to clinic to discuss additional diabetes medication.    Otherwise follow-up in 4 months for an adult wellness visit and diabetes checkup.    You can use Zofran nausea medicine as needed for seasickness during your airplane travel.  It dissolves under the tongue.    Olvin Nguyen MD, MD        Current Outpatient Medications   Medication Sig Dispense Refill     aspirin 81 MG EC tablet [ASPIRIN 81 MG EC TABLET] Take 1 tablet (81 mg total) by mouth daily.  0     BD MELINA 2ND GEN PEN NEEDLE 32 gauge x 5/32\" Ndle [BD MELINA 2ND GEN PEN NEEDLE 32 GAUGE X 5/32\" NDLE] USE ONCE WEEKLY WITH OZEMPIC. 10 each 6     blood glucose test (ACCU-CHEK SMARTVIEW TEST STRIP) strips [BLOOD GLUCOSE TEST (ACCU-CHEK SMARTVIEW TEST STRIP) STRIPS] Use 1 each As Directed daily. 90 strip 3     clobetasol (TEMOVATE) 0.05 % external ointment        fish oil-omega-3 fatty acids 1000 MG capsule Take 2 g by mouth daily       lancets 25 gauge Misc [LANCETS 25 GAUGE MISC] Use 1 each As Directed 2 (two) times a day.       lisinopril (ZESTRIL) 2.5 MG tablet Take 1 tablet (2.5 mg) by mouth daily 90 tablet 3     metFORMIN (GLUCOPHAGE XR) 500 MG 24 hr tablet Take 1 tablet (500 mg) by mouth daily 90 tablet 3     " ondansetron (ZOFRAN ODT) 4 MG ODT tab Take 1 tablet (4 mg) by mouth every 8 hours as needed for nausea 10 tablet 0     rosuvastatin (CRESTOR) 10 MG tablet Take 1 tablet (10 mg) by mouth At Bedtime 90 tablet 3     Semaglutide, 1 MG/DOSE, (OZEMPIC) 4 MG/3ML pen Inject 1 mg Subcutaneous once a week 3 mL 11     vitamin (B COMPLEX-C) tablet Take 1 tablet by mouth daily       Vitamin D3 (CHOLECALCIFEROL) 125 MCG (5000 UT) tablet Take 2 tablets by mouth daily       vitamin E 400 units TABS Take 400 Units by mouth daily       Allergies   Allergen Reactions     Jardiance [Empagliflozin] Other (See Comments)     Yeast infection, constipation     Losartan Muscle Pain (Myalgia)     Muscle cramps     Penicillins Hives     Social History     Tobacco Use     Smoking status: Former     Packs/day: 0.00     Years: 10.00     Pack years: 0.00     Types: Cigarettes     Start date: 1972     Quit date: 1982     Years since quittin.1     Smokeless tobacco: Never   Substance Use Topics     Alcohol use: Not Currently     Drug use: No             Subjective   Zoila is a 67 year old, presenting for the following health issues:  Diabetes and Diarrhea      Diarrhea    History of Present Illness       Diabetes:   She presents for follow up of diabetes.  She is checking home blood glucose one time daily. She checks blood glucose before meals.  Blood glucose is never over 200 and never under 70. She is aware of hypoglycemia symptoms including other. She has no concerns regarding her diabetes at this time.  She is not experiencing numbness or burning in feet, excessive thirst, blurry vision, weight changes or redness, sores or blisters on feet. The patient has had a diabetic eye exam in the last 12 months. Eye exam performed on .         Reason for visit:  A1c check & some digestive problems & travel medication    She eats 4 or more servings of fruits and vegetables daily.She consumes 0 sweetened beverage(s) daily.She  exercises with enough effort to increase her heart rate 10 to 19 minutes per day.  She exercises with enough effort to increase her heart rate 3 or less days per week. She is missing 1 dose(s) of medications per week.  She is not taking prescribed medications regularly due to remembering to take.             Review of Systems   Gastrointestinal: Positive for diarrhea.            Objective    There were no vitals taken for this visit.  There is no height or weight on file to calculate BMI.  Physical Exam

## 2023-03-06 ENCOUNTER — TRANSFERRED RECORDS (OUTPATIENT)
Dept: HEALTH INFORMATION MANAGEMENT | Facility: CLINIC | Age: 68
End: 2023-03-06

## 2023-03-06 LAB — RETINOPATHY: NEGATIVE

## 2023-04-28 ENCOUNTER — TELEPHONE (OUTPATIENT)
Dept: INTERNAL MEDICINE | Facility: CLINIC | Age: 68
End: 2023-04-28
Payer: COMMERCIAL

## 2023-04-28 DIAGNOSIS — E11.9 TYPE 2 DIABETES MELLITUS WITHOUT COMPLICATION, WITHOUT LONG-TERM CURRENT USE OF INSULIN (H): Primary | ICD-10-CM

## 2023-04-28 NOTE — TELEPHONE ENCOUNTER
Outgoing call to relay message from Dr. Nguyen.     Pt states that when she originally called ozempic she was told someone would call her back but hasn't heard from ozempic.     Educated to call clinic if she has any problems with filling prescription.

## 2023-04-28 NOTE — TELEPHONE ENCOUNTER
Not sure what the problem is with the Ozempic prescription.  I did send 1 in    I just sent another prescription for Ozempic today.  Make sure it goes through.

## 2023-04-28 NOTE — TELEPHONE ENCOUNTER
Medication Question or Refill    Contacts       Type Contact Phone/Fax    04/28/2023 11:21 AM CDT Phone (Incoming) Zoila Chavez (Self) [phone] (H)          What medication are you calling about (include dose and sig)?:     Semaglutide, 1 MG/DOSE, (OZEMPIC) 4 MG/3ML pen    Preferred Pharmacy:  [address] [address]  Phone: [phone] Fax: [phone]      Controlled Substance Agreement on file:   CSA -- Patient Level:    CSA: None found at the patient level.       Who prescribed the medication?: Dr Nguyen    Do you need a refill? Yes    When did you use the medication last? 2 weeks ago.    Patient offered an appointment? No    Do you have any questions or concerns?  Yes: Patient has not taken medication for 2 weeks now and she did  the pen from [employer] and it did not work per pt. She contacted [facility] for replacement and [facility] told her to call the OZEMPIC. [facility] couldn't help her, she is requesting for another pen.      Could we send this information to you [facility] or would you prefer to receive a phone call?:   Patient would prefer a phone call   Okay to leave a detailed message?: Yes at Home number on file [phone] (home)

## 2023-05-11 DIAGNOSIS — I10 ESSENTIAL HYPERTENSION: ICD-10-CM

## 2023-05-11 RX ORDER — LISINOPRIL 2.5 MG/1
2.5 TABLET ORAL DAILY
Qty: 90 TABLET | Refills: 3 | Status: SHIPPED | OUTPATIENT
Start: 2023-05-11 | End: 2023-09-06

## 2023-05-27 NOTE — TELEPHONE ENCOUNTER
Called and spoke with patient. Answered her questions. Advised she speak with [facility] to see if she received a recalled lot of metformin, but reassured her most of [place] supply of metformin is not affected. She verbalized understanding.    182.88

## 2023-06-04 ENCOUNTER — HEALTH MAINTENANCE LETTER (OUTPATIENT)
Age: 68
End: 2023-06-04

## 2023-09-06 ENCOUNTER — OFFICE VISIT (OUTPATIENT)
Dept: INTERNAL MEDICINE | Facility: CLINIC | Age: 68
End: 2023-09-06
Payer: COMMERCIAL

## 2023-09-06 ENCOUNTER — NURSE TRIAGE (OUTPATIENT)
Dept: NURSING | Facility: CLINIC | Age: 68
End: 2023-09-06

## 2023-09-06 VITALS
SYSTOLIC BLOOD PRESSURE: 154 MMHG | HEART RATE: 59 BPM | BODY MASS INDEX: 31.58 KG/M2 | OXYGEN SATURATION: 95 % | RESPIRATION RATE: 16 BRPM | WEIGHT: 196.5 LBS | DIASTOLIC BLOOD PRESSURE: 80 MMHG | TEMPERATURE: 98.1 F | HEIGHT: 66 IN

## 2023-09-06 DIAGNOSIS — J06.9 UPPER RESPIRATORY TRACT INFECTION, UNSPECIFIED TYPE: Primary | ICD-10-CM

## 2023-09-06 DIAGNOSIS — I10 ESSENTIAL HYPERTENSION: ICD-10-CM

## 2023-09-06 DIAGNOSIS — E11.9 TYPE 2 DIABETES MELLITUS WITHOUT COMPLICATION, WITHOUT LONG-TERM CURRENT USE OF INSULIN (H): ICD-10-CM

## 2023-09-06 PROCEDURE — 99214 OFFICE O/P EST MOD 30 MIN: CPT | Performed by: INTERNAL MEDICINE

## 2023-09-06 RX ORDER — ALCOHOL ANTISEPTIC PADS
1 PADS, MEDICATED (EA) TOPICAL 2 TIMES DAILY
Qty: 100 EACH | Refills: 0 | Status: SHIPPED | OUTPATIENT
Start: 2023-09-06

## 2023-09-06 RX ORDER — LISINOPRIL 5 MG/1
5 TABLET ORAL DAILY
Qty: 90 TABLET | Refills: 0 | Status: SHIPPED | OUTPATIENT
Start: 2023-09-06 | End: 2023-12-29

## 2023-09-06 RX ORDER — BLOOD SUGAR DIAGNOSTIC
1 STRIP MISCELLANEOUS DAILY
Qty: 90 STRIP | Refills: 0 | Status: SHIPPED | OUTPATIENT
Start: 2023-09-06 | End: 2023-12-01

## 2023-09-06 ASSESSMENT — PAIN SCALES - GENERAL: PAINLEVEL: NO PAIN (0)

## 2023-09-06 NOTE — TELEPHONE ENCOUNTER
Nurse Triage SBAR    Is this a 2nd Level Triage? NO    Situation: Patient calling with a persisting cough and chest congestion. Also having pressure under her eyes and a headache.  Consent: not needed    Background: Patient has had a cough and congestion since last Wed (8/30)  Went to urgency room on Friday and was diagnosed with an upper respiratory infection and prescribed a cough suppressant capsule. Patient took one capsule, but then switched to Mucinex and took one dose last night  Covid test on 8/31 and again on 9/1 - both negative  Can't get into primary clinic until Friday - opening in [place] at 4:30pm today    Assessment:   Cough - productive, pink tinged sputum  Chest tightness   No wheeziness  Pressure under eyes and head  Headache - rates pain 4/10  Nasal congestion - yellowish, green  No fever    Protocol Recommended Disposition:   See today in office    Recommendation: Advised patient to keep appointment at 4:30pm today. Call back or go in to urgent care if symptoms worsen before then. Care advice given. Patient verbalized understanding and agreed with plan.     Raquel Delaney RN [facility] 9/6/2023 7:51 AM    Reason for Disposition   Coughing up lisa-colored (reddish-brown) or blood-tinged sputum    Additional Information   Negative: Bluish (or gray) lips or face   Negative: SEVERE difficulty breathing (e.g., struggling for each breath, speaks in single words)   Negative: Rapid onset of cough and has hives   Negative: Coughing started suddenly after medicine, an allergic food or bee sting   Negative: Difficulty breathing after exposure to flames, smoke, or fumes   Negative: Sounds like a life-threatening emergency to the triager   Negative: Previous asthma attacks and this feels like asthma attack   Negative: Dry cough (non-productive; no sputum or minimal clear sputum) and within 14 days of COVID-19 Exposure   Negative: MODERATE difficulty breathing (e.g., speaks in phrases, SOB  even at rest, pulse 100-120) and still present when not coughing   Negative: Chest pain present when not coughing   Negative: Passed out (i.e., fainted, collapsed and was not responding)   Negative: Patient sounds very sick or weak to the triager   Negative: MILD difficulty breathing (e.g., minimal/no SOB at rest, SOB with walking, pulse <100) and still present when not coughing   Negative: Sounds like a life-threatening emergency to the triager   Negative: Difficulty breathing, and not from stuffy nose (e.g., not relieved by cleaning out the nose)   Negative: SEVERE headache and has fever   Negative: Patient sounds very sick or weak to the triager   Negative: SEVERE sinus pain   Negative: Severe headache   Negative: Redness or swelling on the cheek, forehead, or around the eye   Negative: Fever > 103 F (39.4 C)   Negative: Fever > 101 F (38.3 C) and over 60 years of age   Negative: Fever > 100.0 F (37.8 C) and has diabetes mellitus or a weak immune system (e.g., HIV positive, cancer chemotherapy, organ transplant, splenectomy, chronic steroids)   Negative: Fever > 100.0 F (37.8 C) and bedridden (e.g., nursing home patient, stroke, chronic illness, recovering from surgery)   Negative: Fever present > 3 days (72 hours)   Negative: Fever returns after gone for over 24 hours and symptoms worse or not improved   Negative: Sinus pain (not just congestion) and fever   Negative: Earache   Lots of coughing   Negative: Sinus congestion (pressure, fullness) present > 10 days   Negative: Nasal discharge present > 10 days   Negative: Using nasal washes and pain medicine > 24 hours and sinus pain (lower forehead, cheekbone, or eye) persists   Negative: Coughed up > 1 tablespoon (15 ml) blood (Exception: Blood-tinged sputum.)   Negative: Fever > 103 F (39.4 C)   Negative: Fever > 101 F (38.3 C) and over 60 years of age   Negative: Fever > 100.0 F (37.8 C) and has diabetes mellitus or a weak immune system (e.g., HIV positive,  cancer chemotherapy, organ transplant, splenectomy, chronic steroids)   Negative: Fever > 100.0 F (37.8 C) and bedridden (e.g., CVA, chronic illness, recovering from surgery)   Negative: Increasing ankle swelling   Negative: Wheezing is present   Negative: SEVERE coughing spells (e.g., whooping sound after coughing, vomiting after coughing)    Protocols used: Cough-A-OH, Sinus Pain and Congestion-A-OH

## 2023-09-06 NOTE — PATIENT INSTRUCTIONS
Use flonase nasal spray in both nostrils one to two times daily.     Can continue to use mucinex.     Can take up 4g total of tylenol a day. 1g (two tablets) every 6 hours.     Increase lisinopril to 5 mg daily. You should follow up with Dr. Nguyen in the next 1-2 months.

## 2023-09-06 NOTE — PROGRESS NOTES
Assessment & Plan   Problem List Items Addressed This Visit          Respiratory    Upper respiratory tract infection, unspecified type - Primary     Patient presents on day 8 of symptoms. Has had some progression of illness from scratchy throat, to cough with nasal congestion and now some headache and sinus pressure. Discussed with patient that she is still in the range that this is most likely a viral infection and it is not until symptoms persist for 10-14 days without improvement where we are worried about bacterial sinusitis or pneumonia. Her lung exam is clear today. She does have some edema and bogginess on nasal exam. Has feeling of fullness in maxillary and frontal sinuses but is not tenderness to palpation/percussion of those areas.   - Will continue with symptomatic management for now with tessalon PRN for cough, mucinex PRN to thin secretions, flonase nightly for nasal congestion  - If headache and sinus pressure not better by Friday (day 10), she will message back and we can do a course of abx for sinusitis at that time            Endocrine    DM2 (diabetes mellitus, type 2) (H)     Well controlled on last A1c.   - Refilled test strips and lancets for her today  - Continue ozempic 1 mg daily and metformin 500 mg daily         Relevant Medications    Bullseye Mini Safety Lancets MISC    blood glucose (ACCU-CHEK SMARTVIEW) test strip       Circulatory    Essential hypertension     Above goal today on current regimen of lisinopril 2.5 mg daily.   - Increase lisinopril to 5 mg daily, discussed with patient she will likely need to go up further than this  - Should follow up with PCP for further titration in 1-2 months         Relevant Medications    lisinopril (ZESTRIL) 5 MG tablet        Review of prior external note(s) from - [facility] information from [facility] reviewed  Prescription drug management   MED REC REQUIRED\  Post Medication Reconciliation Status:  Discharge medications reconciled and  "changed, see notes/orders  BMI:   Estimated body mass index is 31.96 kg/m  as calculated from the following:    Height as of this encounter: 1.67 m (5' 5.75\").    Weight as of this encounter: 89.1 kg (196 lb 8 oz).   Weight management plan: Discussed healthy diet and exercise guidelines    FUTURE APPOINTMENTS:       - Follow-up visit in 1-2 months with PCP to address blood pressure    Courtney Brenda Beckett MD  [facility] ELMO Cummings is a 68 year old, presenting for the following health issues:  Hospital F/U (9/1/2023 - URI)        9/6/2023     4:19 PM   Additional Questions   Roomed by ROSE MARIE Simon   Accompanied by N/A       HPI     ED/UC Followup:    Facility:  [facility]  Date of visit: 9/1/2023  Reason for visit: URI  Current Status: Worse    Patient was seen in [facility] for a cough on 9/1/23. COVID negative. Most likely viral infection.     Cough, pressure in eyes and head. Started last Tuesday with scratch in throat. Then Wednesday morning had productive cough, nasal congestion. Then headache.Then on Friday went to [facility], thought viral infection. Gave her tessalon, she only took one or two doses but stopped. She did start mucinex last night. Is using tylenol for headache as well.     Today, she tells me that she has started to have more pressure around her eyes. Cough is the same, still coughing up mucous, which is yellow. Scratching in throat has resolved. Warm water with lemon is soothing. No shortness of breath or wheezing. Did have some chest tightness yesterday.     BP up today at 154/80 and stable on repeat. Current regimen is lisinopril 2.5 mg daily.     Needs refills on some DM supplies (test strips and lancets). Last A1c 6.9 (3/2/23). Current regimen is ozempic 1 mg weekly, metformin 500 mg daily.     Review of Systems   Constitutional, HEENT, cardiovascular, pulmonary, gi and gu systems are negative, except as otherwise noted.    " "  Objective    BP (!) 154/80 (BP Location: Right arm, Patient Position: Sitting, Cuff Size: Adult Large)   Pulse 59   Temp 98.1  F (36.7  C) (Oral)   Resp 16   Ht 1.67 m (5' 5.75\")   Wt 89.1 kg (196 lb 8 oz)   LMP  (LMP Unknown)   SpO2 95%   BMI 31.96 kg/m    Body mass index is 31.96 kg/m .  Physical Exam   GENERAL: healthy, alert and no distress  EYES: Eyes grossly normal to inspection, PERRL and conjunctivae and sclerae normal  HENT: ear canals and TM's normal, b/l nares are erythematous and slightly edematous and mouth without ulcers or lesions  RESP: lungs clear to auscultation - no rales, rhonchi or wheezes  CV: regular rate and rhythm, normal S1 S2, no S3 or S4, no murmur, click or rub, no peripheral edema and peripheral pulses strong  ABDOMEN: soft, nontender, no hepatosplenomegaly, no masses and bowel sounds normal  MS: no gross musculoskeletal defects noted, no edema  SKIN: no suspicious lesions or rashes  NEURO: Normal strength and tone, mentation intact and speech normal  PSYCH: mentation appears normal, affect normal/bright                    "

## 2023-09-07 PROBLEM — J06.9 UPPER RESPIRATORY TRACT INFECTION, UNSPECIFIED TYPE: Status: ACTIVE | Noted: 2023-09-07

## 2023-09-07 PROBLEM — I10 ESSENTIAL HYPERTENSION: Status: ACTIVE | Noted: 2023-09-07

## 2023-09-07 RX ORDER — BENZONATATE 200 MG/1
200 CAPSULE ORAL 3 TIMES DAILY PRN
COMMUNITY
Start: 2023-09-01 | End: 2023-10-23

## 2023-09-07 NOTE — ASSESSMENT & PLAN NOTE
Above goal today on current regimen of lisinopril 2.5 mg daily.   - Increase lisinopril to 5 mg daily, discussed with patient she will likely need to go up further than this  - Should follow up with PCP for further titration in 1-2 months

## 2023-09-07 NOTE — ASSESSMENT & PLAN NOTE
Patient presents on day 8 of symptoms. Has had some progression of illness from scratchy throat, to cough with nasal congestion and now some headache and sinus pressure. Discussed with patient that she is still in the range that this is most likely a viral infection and it is not until symptoms persist for 10-14 days without improvement where we are worried about bacterial sinusitis or pneumonia. Her lung exam is clear today. She does have some edema and bogginess on nasal exam. Has feeling of fullness in maxillary and frontal sinuses but is not tenderness to palpation/percussion of those areas.   - Will continue with symptomatic management for now with tessalon PRN for cough, mucinex PRN to thin secretions, flonase nightly for nasal congestion  - If headache and sinus pressure not better by Friday (day 10), she will message back and we can do a course of abx for sinusitis at that time

## 2023-09-07 NOTE — ASSESSMENT & PLAN NOTE
Well controlled on last A1c.   - Refilled test strips and lancets for her today  - Continue ozempic 1 mg daily and metformin 500 mg daily

## 2023-10-08 ENCOUNTER — HEALTH MAINTENANCE LETTER (OUTPATIENT)
Age: 68
End: 2023-10-08

## 2023-10-19 ENCOUNTER — TELEPHONE (OUTPATIENT)
Dept: INTERNAL MEDICINE | Facility: CLINIC | Age: 68
End: 2023-10-19

## 2023-10-19 DIAGNOSIS — E11.9 TYPE 2 DIABETES MELLITUS WITHOUT COMPLICATION, WITHOUT LONG-TERM CURRENT USE OF INSULIN (H): ICD-10-CM

## 2023-10-19 RX ORDER — BLOOD SUGAR DIAGNOSTIC
1 STRIP MISCELLANEOUS DAILY
Qty: 180 STRIP | Refills: 0 | OUTPATIENT
Start: 2023-10-19

## 2023-10-19 NOTE — TELEPHONE ENCOUNTER
Refill request: [employer] smart view strip. Patient states she is testing twice a day. Changed quantity to 180 and that should reflect to twice a day.

## 2023-10-19 NOTE — TELEPHONE ENCOUNTER
Left a message for patient to call back. We recieved a refill reqest from [facility] for accu-check smart view strip. Pleae clarify with patient how many times a day is she testing her blood sugars?    none

## 2023-10-23 ENCOUNTER — OFFICE VISIT (OUTPATIENT)
Dept: INTERNAL MEDICINE | Facility: CLINIC | Age: 68
End: 2023-10-23
Payer: COMMERCIAL

## 2023-10-23 VITALS
OXYGEN SATURATION: 97 % | SYSTOLIC BLOOD PRESSURE: 158 MMHG | BODY MASS INDEX: 31.02 KG/M2 | WEIGHT: 193 LBS | HEIGHT: 66 IN | RESPIRATION RATE: 16 BRPM | DIASTOLIC BLOOD PRESSURE: 84 MMHG | HEART RATE: 71 BPM | TEMPERATURE: 98.1 F

## 2023-10-23 DIAGNOSIS — E78.00 HYPERCHOLESTEROLEMIA: ICD-10-CM

## 2023-10-23 DIAGNOSIS — E11.9 TYPE 2 DIABETES MELLITUS WITHOUT COMPLICATION, WITHOUT LONG-TERM CURRENT USE OF INSULIN (H): Primary | ICD-10-CM

## 2023-10-23 DIAGNOSIS — I10 ESSENTIAL HYPERTENSION: ICD-10-CM

## 2023-10-23 DIAGNOSIS — Z51.81 ENCOUNTER FOR THERAPEUTIC DRUG MONITORING: ICD-10-CM

## 2023-10-23 DIAGNOSIS — Z86.0100 HISTORY OF COLONIC POLYPS: ICD-10-CM

## 2023-10-23 LAB
ALBUMIN SERPL BCG-MCNC: 4.3 G/DL (ref 3.5–5.2)
ALP SERPL-CCNC: 91 U/L (ref 35–104)
ALT SERPL W P-5'-P-CCNC: 27 U/L (ref 0–50)
ANION GAP SERPL CALCULATED.3IONS-SCNC: 12 MMOL/L (ref 7–15)
AST SERPL W P-5'-P-CCNC: 28 U/L (ref 0–45)
BILIRUB SERPL-MCNC: 0.7 MG/DL
BUN SERPL-MCNC: 17.4 MG/DL (ref 8–23)
CALCIUM SERPL-MCNC: 9.9 MG/DL (ref 8.8–10.2)
CHLORIDE SERPL-SCNC: 103 MMOL/L (ref 98–107)
CHOLEST SERPL-MCNC: 152 MG/DL
CREAT SERPL-MCNC: 0.58 MG/DL (ref 0.51–0.95)
CREAT UR-MCNC: 114 MG/DL
DEPRECATED HCO3 PLAS-SCNC: 26 MMOL/L (ref 22–29)
EGFRCR SERPLBLD CKD-EPI 2021: >90 ML/MIN/1.73M2
GLUCOSE SERPL-MCNC: 144 MG/DL (ref 70–99)
HBA1C MFR BLD: 6.4 % (ref 0–5.6)
HDLC SERPL-MCNC: 54 MG/DL
LDLC SERPL CALC-MCNC: 68 MG/DL
MICROALBUMIN UR-MCNC: 116 MG/L
MICROALBUMIN/CREAT UR: 101.75 MG/G CR (ref 0–25)
NONHDLC SERPL-MCNC: 98 MG/DL
POTASSIUM SERPL-SCNC: 4.4 MMOL/L (ref 3.4–5.3)
PROT SERPL-MCNC: 6.9 G/DL (ref 6.4–8.3)
SODIUM SERPL-SCNC: 141 MMOL/L (ref 135–145)
TRIGL SERPL-MCNC: 150 MG/DL

## 2023-10-23 PROCEDURE — 99214 OFFICE O/P EST MOD 30 MIN: CPT | Performed by: INTERNAL MEDICINE

## 2023-10-23 PROCEDURE — 80053 COMPREHEN METABOLIC PANEL: CPT | Performed by: INTERNAL MEDICINE

## 2023-10-23 PROCEDURE — 82043 UR ALBUMIN QUANTITATIVE: CPT | Performed by: INTERNAL MEDICINE

## 2023-10-23 PROCEDURE — 80061 LIPID PANEL: CPT | Performed by: INTERNAL MEDICINE

## 2023-10-23 PROCEDURE — 82570 ASSAY OF URINE CREATININE: CPT | Performed by: INTERNAL MEDICINE

## 2023-10-23 PROCEDURE — 83036 HEMOGLOBIN GLYCOSYLATED A1C: CPT | Performed by: INTERNAL MEDICINE

## 2023-10-23 PROCEDURE — 36415 COLL VENOUS BLD VENIPUNCTURE: CPT | Performed by: INTERNAL MEDICINE

## 2023-10-23 RX ORDER — LOTEPREDNOL ETABONATE 5 MG/G
1 OINTMENT OPHTHALMIC AT BEDTIME
COMMUNITY
Start: 2023-10-09

## 2023-10-23 NOTE — PATIENT INSTRUCTIONS
Diabetes appears well controlled.  Continue current diabetic diet and regular walking exercise.  Contact me if you are having increasing stomach symptoms or nausea with your medicines.    Goal blood pressure less than 135/85, but not less than 110/60.  You may need a higher dose of lisinopril.  I would recommend an Omron blood pressure cuff and check your blood pressure at different times of the day and record numbers.  Follow-up in approximately 1 month to discuss blood pressure.  This is a nonfasting visit and make sure you take the blood pressure medicine on day of visit.  Bring your blood pressure cuff with you and we can check it against our cuff.    I would recommend the COVID vaccine and flu shot for you this fall.    Your mammogram is due in December.    I will also plan to see you in 4 to 6 months for a diabetic follow-up and adult wellness visit.

## 2023-10-23 NOTE — PROGRESS NOTES
Helen Chavez   68 year old female    Date of Visit: 10/23/2023    Chief Complaint   Patient presents with    Follow Up     BP , A1c. fasting    Fall     Tripped and hit arm of chair on Rt side, still has pain. Fall was over a week ago.     Subjective  68-year-old female lives independently with  who has dementia.    She has not checked blood pressure recently.  But 1 month ago her blood pressure was high in clinic and she did increase the lisinopril to 5 mg a day.  She denies lightheaded dizzy spells.  Previously on 2.5 mg lisinopril.  Blood pressure was 152/86 in March of this year.    She has lost weight since then.    She did not take her lisinopril this morning.    She denies significant nausea or stomach bloating symptoms or abdominal pain.  On Ozempic 1 mg, stable dose.  Metformin 500 mg daily, limited by irritable bowel symptoms in the past.  Irritable bowel is minimal.  Just occasional loose stools.  And she does not want to take Metamucil.  No blood in stool or melena.    May 2021 colonoscopy showed multiple polyps, 3-year follow-up plan for next May.    History of fatty liver but no history of alcohol.  Hepatitis C was negative last year.  ALT borderline at 36 in May.  Weight is down.    She did not tolerate Jardiance in the past with yeast infection.    Hemoglobin A1c level in March is 6.9%.  Her blood sugars have been in the 120-140 range.    No foot neuropathy or sores.    Saw ophthalmology earlier this spring, no retinopathy.    No new vision changes.    Family history of coronary artery disease with father but she has not had an event.  No chest pain or chest pressure or palpitations or increasing shortness of breath or edema.    Non-smoker.    Cholesterol levels have been well controlled on rosuvastatin 10 mg a day.  No generalized myalgia complaints.    She did fall against a chair a week ago without bruising and did have some mild achiness to her right oblique muscle area, that  "is slowly improving.  Worse when getting up out of a chair.    She did have a respiratory tract infection last month but that is fully recovered.  She declines vaccines today.    December 2022 she had a negative mammogram.    2021 microalbumin level was high at 76    PMHx:    Past Medical History:   Diagnosis Date    Diabetes mellitus (H)     Elevated LFTs     History of anesthesia complications     wild after surgery age 15    Hyperlipidemia     Hypertension     Menopause     Microalbuminuria     Warts of foot     right     PSHx:    Past Surgical History:   Procedure Laterality Date    ARTHROSCOPY KNEE PROCEDURE CARTILAGE (GENZYME) Left     BIOPSY BREAST Right 1970    benign lump removed    EXCISE GANGLION WRIST      PILONIDAL CYST / SINUS EXCISION       Immunizations:   Immunization History   Administered Date(s) Administered    COVID-19 MONOVALENT 12+ (Pfizer) 11/12/2021    COVID-19 Monovalent 18+ (Moderna) 02/06/2021, 03/06/2021    Influenza (IIV3) PF 12/11/2003    Influenza Vaccine 65+ (FLUAD) 10/12/2020, 10/14/2021    TDAP (Adacel,Boostrix) 08/20/2007       ROS A comprehensive review of systems was performed and was otherwise negative    Medications, allergies, and problem list were reviewed and updated    Exam  BP (!) 158/84   Pulse 71   Temp 98.1  F (36.7  C)   Resp 16   Ht 1.67 m (5' 5.75\")   Wt 87.5 kg (193 lb)   LMP  (LMP Unknown)   SpO2 97%   BMI 31.39 kg/m    Alert and oriented.  Mildly overweight.  Mobility is normal.  No jaundice.  Lungs are clear.  Heart is regular without murmur.  Abdomen is nontender.  No ankle edema    Assessment/Plan  1. Type 2 diabetes mellitus without complication, without long-term current use of insulin (H)  Appears well controlled.  Check hemoglobin A1c.  Tolerating Ozempic and metformin.  History of irritable bowel with metformin but tolerating current dose.  I did discuss Metamucil on a daily basis that could be added if needed for irritable bowel symptoms.    I " discussed gastroparesis and other potential side effects of Ozempic.  She is not appearing to have any GI toxicity at this time.  Continue current treatment plan.  See patient instructions.    Yearly eye exam will be due next spring.  - Albumin Random Urine Quantitative with Creat Ratio  - HEMOGLOBIN A1C    2. Essential hypertension  Not controlled.  See patient instructions.  Return in 1 month to evaluate for higher dose of lisinopril.  Continue 5 mg of lisinopril at this time    3. Hypercholesterolemia  Has been well controlled on Crestor 10 mg  - Lipid panel reflex to direct LDL Non-fasting    4. History of colonic polyps  Patient was reminded that her 3-year colonoscopy will be due May 2024.    5. Encounter for therapeutic drug monitoring    - Comprehensive metabolic panel    She did decline COVID and flu shot today but I did recommend she consider those vaccines at local pharmacy as soon as she is ready.    There was no bruising or significant tenderness at right oblique muscle, but she did describe a muscle contusion from falling into a chair, symptoms should continue to improve over the next couple of weeks.  If any worsening symptoms to follow-up for further evaluation.    She was reminded that her mammogram is due in December.      Return in about 4 weeks (around 11/20/2023) for Nonfasting blood pressure follow-up appointment.   Patient Instructions   Diabetes appears well controlled.  Continue current diabetic diet and regular walking exercise.  Contact me if you are having increasing stomach symptoms or nausea with your medicines.    Goal blood pressure less than 135/85, but not less than 110/60.  You may need a higher dose of lisinopril.  I would recommend an Omron blood pressure cuff and check your blood pressure at different times of the day and record numbers.  Follow-up in approximately 1 month to discuss blood pressure.  This is a nonfasting visit and make sure you take the blood pressure medicine  "on day of visit.  Bring your blood pressure cuff with you and we can check it against our cuff.    I would recommend the COVID vaccine and flu shot for you this fall.    Your mammogram is due in December.    I will also plan to see you in 4 to 6 months for a diabetic follow-up and adult wellness visit.    Olvin Nguyen MD, MD        Current Outpatient Medications   Medication Sig Dispense Refill    aspirin 81 MG EC tablet [ASPIRIN 81 MG EC TABLET] Take 1 tablet (81 mg total) by mouth daily.  0    BD MELINA 2ND GEN PEN NEEDLE 32 gauge x 5/32\" Ndle [BD MELINA 2ND GEN PEN NEEDLE 32 GAUGE X 5/32\" NDLE] USE ONCE WEEKLY WITH OZEMPIC. 10 each 6    blood glucose (ACCU-CHEK SMARTVIEW) test strip 1 strip by In Vitro route daily 90 strip 0    Bullseye Mini Safety Lancets MISC 1 each 2 times daily 100 each 0    clobetasol (TEMOVATE) 0.05 % external ointment       fish oil-omega-3 fatty acids 1000 MG capsule Take 2 g by mouth daily      lisinopril (ZESTRIL) 5 MG tablet Take 1 tablet (5 mg) by mouth daily 90 tablet 0    LOTEMAX 0.5 % OINT opthalmic ointment Place 1 Application into both eyes at bedtime      metFORMIN (GLUCOPHAGE XR) 500 MG 24 hr tablet Take 1 tablet (500 mg) by mouth daily 90 tablet 3    rosuvastatin (CRESTOR) 10 MG tablet Take 1 tablet (10 mg) by mouth At Bedtime 90 tablet 3    Semaglutide, 1 MG/DOSE, (OZEMPIC) 4 MG/3ML pen Inject 1 mg Subcutaneous every 7 days 9 mL 4    vitamin (B COMPLEX-C) tablet Take 1 tablet by mouth daily      Vitamin D3 (CHOLECALCIFEROL) 125 MCG (5000 UT) tablet Take 2 tablets by mouth daily      vitamin E 400 units TABS Take 400 Units by mouth daily       Allergies   Allergen Reactions    Jardiance [Empagliflozin] Other (See Comments)     Yeast infection, constipation    Losartan Muscle Pain (Myalgia)     Muscle cramps    Penicillins Hives     Social History     Tobacco Use    Smoking status: Former     Packs/day: 0.00     Years: 10.00     Additional pack years: 0.00     Total pack years: " 0.00     Types: Cigarettes     Start date: 1972     Quit date: 1982     Years since quittin.8    Smokeless tobacco: Never   Vaping Use    Vaping Use: Never used   Substance Use Topics    Alcohol use: Not Currently    Drug use: No             Subjective   Zoila is a 68 year old, presenting for the following health issues:  Follow Up (BP , A1c. fasting) and Fall (Tripped and hit arm of chair on Rt side, still has pain. Fall was over a week ago.)      10/23/2023     7:21 AM   Additional Questions   Roomed by Elizabeth WELLS   Accompanied by luciano         10/23/2023     7:21 AM   Patient Reported Additional Medications   Patient reports taking the following new medications no       Fall    History of Present Illness       Reason for visit:  Bp follow up    She eats 4 or more servings of fruits and vegetables daily.She consumes 0 sweetened beverage(s) daily.She exercises with enough effort to increase her heart rate 30 to 60 minutes per day.  She exercises with enough effort to increase her heart rate 3 or less days per week. She is missing 1 dose(s) of medications per week.  She is not taking prescribed medications regularly due to remembering to take.                 Review of Systems         Objective    LMP  (LMP Unknown)   There is no height or weight on file to calculate BMI.  Physical Exam

## 2023-12-01 DIAGNOSIS — E11.9 TYPE 2 DIABETES MELLITUS WITHOUT COMPLICATION, WITHOUT LONG-TERM CURRENT USE OF INSULIN (H): ICD-10-CM

## 2023-12-01 RX ORDER — BLOOD SUGAR DIAGNOSTIC
1 STRIP MISCELLANEOUS DAILY
Qty: 90 STRIP | Refills: 2 | Status: SHIPPED | OUTPATIENT
Start: 2023-12-01

## 2023-12-04 ENCOUNTER — PATIENT OUTREACH (OUTPATIENT)
Dept: INTERNAL MEDICINE | Facility: CLINIC | Age: 68
End: 2023-12-04
Payer: COMMERCIAL

## 2023-12-04 NOTE — TELEPHONE ENCOUNTER
Patient Quality Outreach    Patient is due for the following:   Physical Preventive Adult Physical    Next Steps:   No follow up needed at this time.    Type of outreach:    Sent MADS message.      Questions for provider review:    None           Lucía Ying RN

## 2023-12-15 ENCOUNTER — HOSPITAL ENCOUNTER (OUTPATIENT)
Dept: MAMMOGRAPHY | Facility: CLINIC | Age: 68
Discharge: HOME | End: 2023-12-15
Attending: INTERNAL MEDICINE | Admitting: INTERNAL MEDICINE
Payer: COMMERCIAL

## 2023-12-15 DIAGNOSIS — Z12.31 VISIT FOR SCREENING MAMMOGRAM: ICD-10-CM

## 2023-12-15 PROCEDURE — 77067 SCR MAMMO BI INCL CAD: CPT

## 2023-12-29 DIAGNOSIS — I10 ESSENTIAL HYPERTENSION: ICD-10-CM

## 2023-12-29 RX ORDER — LISINOPRIL 5 MG/1
5 TABLET ORAL DAILY
Qty: 90 TABLET | Refills: 0 | Status: SHIPPED | OUTPATIENT
Start: 2023-12-29 | End: 2024-03-18

## 2024-02-23 ENCOUNTER — TRANSFERRED RECORDS (OUTPATIENT)
Dept: HEALTH INFORMATION MANAGEMENT | Facility: CLINIC | Age: 69
End: 2024-02-23
Payer: COMMERCIAL

## 2024-02-23 LAB — RETINOPATHY: POSITIVE

## 2024-03-17 DIAGNOSIS — I10 ESSENTIAL HYPERTENSION: ICD-10-CM

## 2024-03-18 ENCOUNTER — TRANSFERRED RECORDS (OUTPATIENT)
Dept: HEALTH INFORMATION MANAGEMENT | Facility: CLINIC | Age: 69
End: 2024-03-18
Payer: COMMERCIAL

## 2024-03-18 LAB — RETINOPATHY: NEGATIVE

## 2024-03-18 RX ORDER — LISINOPRIL 5 MG/1
5 TABLET ORAL DAILY
Qty: 90 TABLET | Refills: 3 | Status: SHIPPED | OUTPATIENT
Start: 2024-03-18 | End: 2024-04-19

## 2024-03-19 DIAGNOSIS — E78.00 HYPERCHOLESTEROLEMIA: ICD-10-CM

## 2024-03-19 RX ORDER — ROSUVASTATIN CALCIUM 10 MG/1
10 TABLET, COATED ORAL AT BEDTIME
Qty: 90 TABLET | Refills: 1 | Status: SHIPPED | OUTPATIENT
Start: 2024-03-19 | End: 2024-10-02

## 2024-04-19 ENCOUNTER — OFFICE VISIT (OUTPATIENT)
Dept: INTERNAL MEDICINE | Facility: CLINIC | Age: 69
End: 2024-04-19
Payer: COMMERCIAL

## 2024-04-19 VITALS
BODY MASS INDEX: 30.05 KG/M2 | TEMPERATURE: 98.3 F | OXYGEN SATURATION: 96 % | HEIGHT: 66 IN | RESPIRATION RATE: 16 BRPM | HEART RATE: 69 BPM | WEIGHT: 187 LBS | SYSTOLIC BLOOD PRESSURE: 120 MMHG | DIASTOLIC BLOOD PRESSURE: 68 MMHG

## 2024-04-19 DIAGNOSIS — E11.9 TYPE 2 DIABETES MELLITUS WITHOUT COMPLICATION, WITHOUT LONG-TERM CURRENT USE OF INSULIN (H): Primary | ICD-10-CM

## 2024-04-19 DIAGNOSIS — Z23 NEED FOR VACCINATION AGAINST STREPTOCOCCUS PNEUMONIAE: ICD-10-CM

## 2024-04-19 DIAGNOSIS — E78.00 HYPERCHOLESTEROLEMIA: ICD-10-CM

## 2024-04-19 DIAGNOSIS — Z51.81 ENCOUNTER FOR THERAPEUTIC DRUG MONITORING: ICD-10-CM

## 2024-04-19 DIAGNOSIS — I10 ESSENTIAL HYPERTENSION: ICD-10-CM

## 2024-04-19 DIAGNOSIS — Z86.0100 HISTORY OF COLONIC POLYPS: ICD-10-CM

## 2024-04-19 DIAGNOSIS — Z78.0 POSTMENOPAUSAL STATUS: ICD-10-CM

## 2024-04-19 LAB — HBA1C MFR BLD: 6.4 % (ref 0–5.6)

## 2024-04-19 PROCEDURE — 36415 COLL VENOUS BLD VENIPUNCTURE: CPT | Performed by: INTERNAL MEDICINE

## 2024-04-19 PROCEDURE — G0009 ADMIN PNEUMOCOCCAL VACCINE: HCPCS | Performed by: INTERNAL MEDICINE

## 2024-04-19 PROCEDURE — 90677 PCV20 VACCINE IM: CPT | Performed by: INTERNAL MEDICINE

## 2024-04-19 PROCEDURE — 83036 HEMOGLOBIN GLYCOSYLATED A1C: CPT | Performed by: INTERNAL MEDICINE

## 2024-04-19 PROCEDURE — 99214 OFFICE O/P EST MOD 30 MIN: CPT | Mod: 25 | Performed by: INTERNAL MEDICINE

## 2024-04-19 PROCEDURE — 80053 COMPREHEN METABOLIC PANEL: CPT | Performed by: INTERNAL MEDICINE

## 2024-04-19 RX ORDER — LISINOPRIL 5 MG/1
5 TABLET ORAL DAILY
Qty: 90 TABLET | Refills: 3 | Status: SHIPPED | OUTPATIENT
Start: 2024-04-19 | End: 2024-10-02

## 2024-04-19 RX ORDER — METFORMIN HCL 500 MG
500 TABLET, EXTENDED RELEASE 24 HR ORAL DAILY
Qty: 90 TABLET | Refills: 3 | Status: SHIPPED | OUTPATIENT
Start: 2024-04-19 | End: 2024-08-07

## 2024-04-19 NOTE — PATIENT INSTRUCTIONS
You have a corn on your left foot.  You can gently shave down the corn to avoid pain.  You can use corn pads.  Wear good comfortable shoes and do not use orthotics if that is causing pain on the corn.  You can see a podiatrist to help with management of the corn.  Monitor for any sign of ulcer or infection.    Regular walking and exercise is important for your your body's health.  Walking and stretching for at least 20 minutes a day is recommended.    You are due for your colonoscopy in May.  Contact [facility] when you are ready for proceeding with that    You can do a DEXA bone density scan to screen for osteoporosis sometime this year.    Consider Shingrix shingles vaccine at local pharmacy.  Consider COVID-19 vaccine at local pharmacy.    Continue yearly mammograms in December.    Goal blood pressure less than 135/85 but not less than 110/60.  Contact me if your blood pressure is outside of that range more than occasionally.    Follow-up for adult wellness visit physical exam and diabetes follow-up appointment in approximately 6 months.

## 2024-04-19 NOTE — PROGRESS NOTES
Helen Chavez   68 year old female    Date of Visit: 4/19/2024    Chief Complaint   Patient presents with    Diabetes     Diabetic check with A1c - not fasting / check foot sore on left foot     Subjective  Here for follow-up on diabetes and hypertension.    Caring for her  with increasing memory issues.    She does use the treadmill regularly, walks and tries to stay active.  Good exertional ability and no chest pain or increasing shortness of breath with activity.    Family history of coronary disease with father.  Quit smoking in 1982.    On rosuvastatin 10 mg a day.  LDL 68 with HDL 54 last October.    No new generalized myalgias and no history of liver problems.    No epigastric pain or bleeding on aspirin daily.    Diabetes type 2, does not check blood sugars very often but controlled when she does check.  Hemoglobin A1c level was 6.4% last October.  On metformin 500 mg a day because of irritable bowel history with well-controlled currently.  She has not wanted to use metformin.  Denies any emesis on Ozempic 1 mg weekly.  Some mild nausea intermittently.    No foot sores or numbness.    I did review the ophthalmology note from March 2024 and no retinopathy.    Denies orthostasis.  Blood pressure at home has been 120/60 range.  On lisinopril 5 mg a day.    Urine microalbumin level was 101 October 2023.    No edema.    History of fatty liver, no significant alcohol.  Negative for hepatitis C in 2022.  Normal liver test last October.  No new right upper quadrant pain or jaundice.    Bowels are normal and no blood in stool.  Colonoscopy May 2021 showed multiple polyps and 3-year follow-up colonoscopy is due next month.    No vaginal bleeding or spotting.    December 2023 mammogram was negative.    DEXA scan October 2021 with a femur score -1.8 and spine score -0.7.  No falls.  No fracture history.    History of left ovarian cyst that was stable in 2022 and no further follow-up planned.    She  "has a corn on the left side of her foot for over a month.  Moderate pain with certain orthotics.    PMHx:    Past Medical History:   Diagnosis Date    Diabetes mellitus (H)     Elevated LFTs     History of anesthesia complications     wild after surgery age 15    Hyperlipidemia     Hypertension     Menopause     Microalbuminuria     Warts of foot     right     PSHx:    Past Surgical History:   Procedure Laterality Date    ARTHROSCOPY KNEE PROCEDURE CARTILAGE (GENZYME) Left     BIOPSY BREAST Right 1970    benign lump removed    EXCISE GANGLION WRIST      PILONIDAL CYST / SINUS EXCISION       Immunizations:   Immunization History   Administered Date(s) Administered    COVID-19 MONOVALENT 12+ (Pfizer) 11/12/2021    COVID-19 Monovalent 18+ (Moderna) 02/06/2021, 03/06/2021    Influenza (IIV3) PF 12/11/2003    Influenza Vaccine 65+ (FLUAD) 10/12/2020, 10/14/2021    Pneumococcal 20 valent Conjugate (Prevnar 20) 04/19/2024    TDAP (Adacel,Boostrix) 08/20/2007       ROS A comprehensive review of systems was performed and was otherwise negative    Medications, allergies, and problem list were reviewed and updated    Exam  /68 (BP Location: Left arm, Patient Position: Sitting, Cuff Size: Adult Regular)   Pulse 69   Temp 98.3  F (36.8  C)   Resp 16   Ht 1.67 m (5' 5.75\")   Wt 84.8 kg (187 lb)   LMP  (LMP Unknown)   SpO2 96%   BMI 30.41 kg/m    Mildly overweight.  Mobility is normal.  No jaundice.  Lungs are clear.  Heart is regular without murmur.  Abdomen nontender.  No ankle edema.  Small corn on the lateral aspect of her left foot without ulceration or infection.    Assessment/Plan  1. Type 2 diabetes mellitus without complication, without long-term current use of insulin (H)  Well-controlled.  Continue current medications which she is tolerating.    Yearly eye exam in March was negative.      - Hemoglobin A1c  - Semaglutide, 1 MG/DOSE, (OZEMPIC) 4 MG/3ML pen; Inject 1 mg Subcutaneous every 7 days  Dispense: 9 " mL; Refill: 4  - metFORMIN (GLUCOPHAGE XR) 500 MG 24 hr tablet; Take 1 tablet (500 mg) by mouth daily  Dispense: 90 tablet; Refill: 3    2. Essential hypertension  Controlled.  Continue current lisinopril  - lisinopril (ZESTRIL) 5 MG tablet; Take 1 tablet (5 mg) by mouth daily  Dispense: 90 tablet; Refill: 3    3. Hypercholesterolemia  Goal LDL less than 100.  Well-controlled on rosuvastatin 10 mg a day.  Aspirin daily    4. History of colonic polyps  Due for 3-year colonoscopy.  She wants to do it through [facility], and is in contact with them to set that up  - Colonoscopy Screening  Referral; Future    5. Encounter for therapeutic drug monitoring    - Comprehensive metabolic panel    6. Need for vaccination against Streptococcus pneumoniae  Given today  - Pneumococcal 20 Valent Conjugate (PCV20)    I also recommended the Shingrix vaccine for patient.  I recommended the COVID-19 vaccine but she declined today.  I encouraged her to consider that at local pharmacy    7. Postmenopausal status  Due for DEXA screening  - DX Bone Density; Future    Corn on her foot, use corn pads or emery board.  Can follow-up with podiatry.  Avoid orthotics that hurt her foot.  She was told to monitor for infection.    The longitudinal plan of care for the diagnosis(es)/condition(s) as documented were addressed during this visit. Due to the added complexity in care, I will continue to support Zoila in the subsequent management and with ongoing continuity of care.        Return in about 6 months (around 10/19/2024) for Adult wellness visit and diabetes follow-up, fasting.   Patient Instructions   You have a corn on your left foot.  You can gently shave down the corn to avoid pain.  You can use corn pads.  Wear good comfortable shoes and do not use orthotics if that is causing pain on the corn.  You can see a podiatrist to help with management of the corn.  Monitor for any sign of ulcer or infection.    Regular walking and  "exercise is important for your your body's health.  Walking and stretching for at least 20 minutes a day is recommended.    You are due for your colonoscopy in May.  Contact [facility] when you are ready for proceeding with that    You can do a DEXA bone density scan to screen for osteoporosis sometime this year.    Consider Shingrix shingles vaccine at local pharmacy.  Consider COVID-19 vaccine at local pharmacy.    Continue yearly mammograms in December.    Goal blood pressure less than 135/85 but not less than 110/60.  Contact me if your blood pressure is outside of that range more than occasionally.    Follow-up for adult wellness visit physical exam and diabetes follow-up appointment in approximately 6 months.        Olvin Nguyen MD, MD        Current Outpatient Medications   Medication Sig Dispense Refill    aspirin 81 MG EC tablet [ASPIRIN 81 MG EC TABLET] Take 1 tablet (81 mg total) by mouth daily.  0    BD MELINA 2ND GEN PEN NEEDLE 32 gauge x 5/32\" Ndle [BD MELINA 2ND GEN PEN NEEDLE 32 GAUGE X 5/32\" NDLE] USE ONCE WEEKLY WITH OZEMPIC. 10 each 6    blood glucose (ACCU-CHEK SMARTVIEW) test strip 1 strip by In Vitro route daily 90 strip 2    Bullseye Mini Safety Lancets MISC 1 each 2 times daily 100 each 0    lisinopril (ZESTRIL) 5 MG tablet Take 1 tablet (5 mg) by mouth daily 90 tablet 3    LOTEMAX 0.5 % OINT opthalmic ointment Place 1 Application into both eyes at bedtime      metFORMIN (GLUCOPHAGE XR) 500 MG 24 hr tablet Take 1 tablet (500 mg) by mouth daily 90 tablet 3    rosuvastatin (CRESTOR) 10 MG tablet Take 1 tablet (10 mg) by mouth at bedtime 90 tablet 1    Semaglutide, 1 MG/DOSE, (OZEMPIC) 4 MG/3ML pen Inject 1 mg Subcutaneous every 7 days 9 mL 4    vitamin (B COMPLEX-C) tablet Take 1 tablet by mouth daily      Vitamin D3 (CHOLECALCIFEROL) 125 MCG (5000 UT) tablet Take 2 tablets by mouth daily       Allergies   Allergen Reactions    Jardiance [Empagliflozin] Other (See Comments)     Yeast infection, " "constipation    Losartan Muscle Pain (Myalgia)     Muscle cramps    Penicillins Hives     Social History     Tobacco Use    Smoking status: Former     Current packs/day: 0.00     Types: Cigarettes     Start date: 1972     Quit date: 1982     Years since quittin.3     Passive exposure: Past    Smokeless tobacco: Never   Vaping Use    Vaping status: Never Used   Substance Use Topics    Alcohol use: Not Currently    Drug use: No             Subjective   Zoila is a 68 year old, presenting for the following health issues:  Diabetes (Diabetic check with A1c - not fasting / check foot sore on left foot)      2024     9:52 AM   Additional Questions   Roomed by Dori FARFAN     History of Present Illness       Diabetes:   She presents for follow up of diabetes.  She is checking home blood glucose one time daily.   She checks blood glucose before meals.  Blood glucose is never over 200 and never under 70.    She is concerned about other.   She is having numbness in feet and redness, sores, or blisters on feet.            Hypertension: She presents for follow up of hypertension.  She does check blood pressure  regularly outside of the clinic. Outpatient blood pressures have not been over 140/90. She does not follow a low salt diet.     She eats 4 or more servings of fruits and vegetables daily.She consumes 0 sweetened beverage(s) daily.She exercises with enough effort to increase her heart rate 10 to 19 minutes per day.  She exercises with enough effort to increase her heart rate 3 or less days per week. She is missing 1 dose(s) of medications per week.  She is not taking prescribed medications regularly due to remembering to take.                     Objective    /68 (BP Location: Left arm, Patient Position: Sitting, Cuff Size: Adult Regular)   Pulse 69   Temp 98.3  F (36.8  C)   Resp 16   Ht 1.67 m (5' 5.75\")   Wt 84.8 kg (187 lb)   LMP  (LMP Unknown)   SpO2 96%   BMI 30.41 kg/m    Body mass index " is 30.41 kg/m .  Physical Exam               Signed Electronically by: Olvin Nguyen MD

## 2024-04-20 LAB
ALBUMIN SERPL BCG-MCNC: 4.6 G/DL (ref 3.5–5.2)
ALP SERPL-CCNC: 78 U/L (ref 40–150)
ALT SERPL W P-5'-P-CCNC: 31 U/L (ref 0–50)
ANION GAP SERPL CALCULATED.3IONS-SCNC: 11 MMOL/L (ref 7–15)
AST SERPL W P-5'-P-CCNC: 28 U/L (ref 0–45)
BILIRUB SERPL-MCNC: 1.4 MG/DL
BUN SERPL-MCNC: 18.9 MG/DL (ref 8–23)
CALCIUM SERPL-MCNC: 9.9 MG/DL (ref 8.8–10.2)
CHLORIDE SERPL-SCNC: 103 MMOL/L (ref 98–107)
CREAT SERPL-MCNC: 0.58 MG/DL (ref 0.51–0.95)
DEPRECATED HCO3 PLAS-SCNC: 25 MMOL/L (ref 22–29)
EGFRCR SERPLBLD CKD-EPI 2021: >90 ML/MIN/1.73M2
GLUCOSE SERPL-MCNC: 143 MG/DL (ref 70–99)
POTASSIUM SERPL-SCNC: 4.3 MMOL/L (ref 3.4–5.3)
PROT SERPL-MCNC: 7 G/DL (ref 6.4–8.3)
SODIUM SERPL-SCNC: 139 MMOL/L (ref 135–145)

## 2024-05-22 ENCOUNTER — ANCILLARY PROCEDURE (OUTPATIENT)
Dept: BONE DENSITY | Facility: CLINIC | Age: 69
End: 2024-05-22
Attending: INTERNAL MEDICINE
Payer: COMMERCIAL

## 2024-05-22 DIAGNOSIS — Z78.0 POSTMENOPAUSAL STATUS: ICD-10-CM

## 2024-05-22 PROCEDURE — 77089 TBS DXA CAL W/I&R FX RISK: CPT | Performed by: PHYSICIAN ASSISTANT

## 2024-05-22 PROCEDURE — 77080 DXA BONE DENSITY AXIAL: CPT | Mod: TC | Performed by: PHYSICIAN ASSISTANT

## 2024-07-14 ENCOUNTER — HEALTH MAINTENANCE LETTER (OUTPATIENT)
Age: 69
End: 2024-07-14

## 2024-08-07 DIAGNOSIS — E11.9 TYPE 2 DIABETES MELLITUS WITHOUT COMPLICATION, WITHOUT LONG-TERM CURRENT USE OF INSULIN (H): ICD-10-CM

## 2024-08-07 RX ORDER — METFORMIN HCL 500 MG
500 TABLET, EXTENDED RELEASE 24 HR ORAL DAILY
Qty: 90 TABLET | Refills: 2 | Status: SHIPPED | OUTPATIENT
Start: 2024-08-07

## 2024-08-14 ENCOUNTER — TELEPHONE (OUTPATIENT)
Dept: INTERNAL MEDICINE | Facility: CLINIC | Age: 69
End: 2024-08-14
Payer: COMMERCIAL

## 2024-08-14 NOTE — TELEPHONE ENCOUNTER
Patient Quality Outreach    Patient is due for the following:   Physical Annual Wellness Visit    Next Steps:   Patient was scheduled for 10/11/24 for AWV    Type of outreach:    Pt has AWV 10/11/24      Questions for provider review:    None           Lamar Briceno, CMA

## 2024-09-09 ENCOUNTER — TRANSFERRED RECORDS (OUTPATIENT)
Dept: HEALTH INFORMATION MANAGEMENT | Facility: CLINIC | Age: 69
End: 2024-09-09
Payer: COMMERCIAL

## 2024-10-02 DIAGNOSIS — E78.00 HYPERCHOLESTEROLEMIA: ICD-10-CM

## 2024-10-02 DIAGNOSIS — I10 ESSENTIAL HYPERTENSION: ICD-10-CM

## 2024-10-02 RX ORDER — ROSUVASTATIN CALCIUM 10 MG/1
10 TABLET, COATED ORAL
Qty: 100 TABLET | Refills: 2 | Status: SHIPPED | OUTPATIENT
Start: 2024-10-02

## 2024-10-02 RX ORDER — LISINOPRIL 5 MG/1
5 TABLET ORAL DAILY
Qty: 100 TABLET | Refills: 2 | Status: SHIPPED | OUTPATIENT
Start: 2024-10-02

## 2024-10-02 NOTE — TELEPHONE ENCOUNTER
Patient has [facility] coverage and with this insurance plan, the patient is eligible to receive certain prescriptions as a 100-day supply at the 90-day supply cost.      Prescriptions updated to 100-day supply per protocol: lisinopril and rosuvastatin      Ever RamonD, [facility]  Population Health Pharmacist  [phone]

## 2024-12-01 ENCOUNTER — HEALTH MAINTENANCE LETTER (OUTPATIENT)
Age: 69
End: 2024-12-01

## 2024-12-01 SDOH — HEALTH STABILITY: PHYSICAL HEALTH: ON AVERAGE, HOW MANY MINUTES DO YOU ENGAGE IN EXERCISE AT THIS LEVEL?: PATIENT DECLINED

## 2024-12-01 SDOH — HEALTH STABILITY: PHYSICAL HEALTH: ON AVERAGE, HOW MANY DAYS PER WEEK DO YOU ENGAGE IN MODERATE TO STRENUOUS EXERCISE (LIKE A BRISK WALK)?: 3 DAYS

## 2024-12-01 ASSESSMENT — SOCIAL DETERMINANTS OF HEALTH (SDOH): HOW OFTEN DO YOU GET TOGETHER WITH FRIENDS OR RELATIVES?: THREE TIMES A WEEK

## 2024-12-04 ENCOUNTER — OFFICE VISIT (OUTPATIENT)
Dept: INTERNAL MEDICINE | Facility: CLINIC | Age: 69
End: 2024-12-04
Payer: COMMERCIAL

## 2024-12-04 VITALS
RESPIRATION RATE: 16 BRPM | WEIGHT: 198 LBS | HEIGHT: 66 IN | HEART RATE: 64 BPM | BODY MASS INDEX: 31.82 KG/M2 | OXYGEN SATURATION: 96 % | DIASTOLIC BLOOD PRESSURE: 95 MMHG | TEMPERATURE: 98.3 F | SYSTOLIC BLOOD PRESSURE: 149 MMHG

## 2024-12-04 DIAGNOSIS — I10 ESSENTIAL HYPERTENSION: ICD-10-CM

## 2024-12-04 DIAGNOSIS — E78.00 HYPERCHOLESTEROLEMIA: ICD-10-CM

## 2024-12-04 DIAGNOSIS — Z12.31 VISIT FOR SCREENING MAMMOGRAM: ICD-10-CM

## 2024-12-04 DIAGNOSIS — E11.69 TYPE 2 DIABETES MELLITUS WITH OTHER SPECIFIED COMPLICATION, WITHOUT LONG-TERM CURRENT USE OF INSULIN (H): ICD-10-CM

## 2024-12-04 DIAGNOSIS — M85.851 OSTEOPENIA OF BOTH HIPS: ICD-10-CM

## 2024-12-04 DIAGNOSIS — R29.898 HIP TIGHTNESS: ICD-10-CM

## 2024-12-04 DIAGNOSIS — M85.852 OSTEOPENIA OF BOTH HIPS: ICD-10-CM

## 2024-12-04 DIAGNOSIS — Z86.0100 HISTORY OF COLONIC POLYPS: ICD-10-CM

## 2024-12-04 DIAGNOSIS — Z00.00 ANNUAL WELLNESS VISIT: Primary | ICD-10-CM

## 2024-12-04 DIAGNOSIS — Z51.81 ENCOUNTER FOR THERAPEUTIC DRUG MONITORING: ICD-10-CM

## 2024-12-04 LAB
ALBUMIN SERPL BCG-MCNC: 4.4 G/DL (ref 3.5–5.2)
ALP SERPL-CCNC: 78 U/L (ref 40–150)
ALT SERPL W P-5'-P-CCNC: 39 U/L (ref 0–50)
ANION GAP SERPL CALCULATED.3IONS-SCNC: 10 MMOL/L (ref 7–15)
AST SERPL W P-5'-P-CCNC: 36 U/L (ref 0–45)
BILIRUB SERPL-MCNC: 1.4 MG/DL
BUN SERPL-MCNC: 17.7 MG/DL (ref 8–23)
CALCIUM SERPL-MCNC: 9.9 MG/DL (ref 8.8–10.4)
CHLORIDE SERPL-SCNC: 103 MMOL/L (ref 98–107)
CHOLEST SERPL-MCNC: 137 MG/DL
CREAT SERPL-MCNC: 0.56 MG/DL (ref 0.51–0.95)
CREAT UR-MCNC: 144 MG/DL
EGFRCR SERPLBLD CKD-EPI 2021: >90 ML/MIN/1.73M2
ERYTHROCYTE [DISTWIDTH] IN BLOOD BY AUTOMATED COUNT: 11.7 % (ref 10–15)
EST. AVERAGE GLUCOSE BLD GHB EST-MCNC: 146 MG/DL
FASTING STATUS PATIENT QL REPORTED: YES
FASTING STATUS PATIENT QL REPORTED: YES
GLUCOSE SERPL-MCNC: 128 MG/DL (ref 70–99)
HBA1C MFR BLD: 6.7 % (ref 0–5.6)
HCO3 SERPL-SCNC: 25 MMOL/L (ref 22–29)
HCT VFR BLD AUTO: 40.7 % (ref 35–47)
HDLC SERPL-MCNC: 58 MG/DL
HGB BLD-MCNC: 14 G/DL (ref 11.7–15.7)
LDLC SERPL CALC-MCNC: 59 MG/DL
MCH RBC QN AUTO: 31 PG (ref 26.5–33)
MCHC RBC AUTO-ENTMCNC: 34.4 G/DL (ref 31.5–36.5)
MCV RBC AUTO: 90 FL (ref 78–100)
MICROALBUMIN UR-MCNC: 214 MG/L
MICROALBUMIN/CREAT UR: 148.61 MG/G CR (ref 0–25)
NONHDLC SERPL-MCNC: 79 MG/DL
PLATELET # BLD AUTO: 168 10E3/UL (ref 150–450)
POTASSIUM SERPL-SCNC: 4.2 MMOL/L (ref 3.4–5.3)
PROT SERPL-MCNC: 6.9 G/DL (ref 6.4–8.3)
RBC # BLD AUTO: 4.51 10E6/UL (ref 3.8–5.2)
SODIUM SERPL-SCNC: 138 MMOL/L (ref 135–145)
TRIGL SERPL-MCNC: 100 MG/DL
WBC # BLD AUTO: 5.8 10E3/UL (ref 4–11)

## 2024-12-04 PROCEDURE — 36415 COLL VENOUS BLD VENIPUNCTURE: CPT | Performed by: INTERNAL MEDICINE

## 2024-12-04 PROCEDURE — 85027 COMPLETE CBC AUTOMATED: CPT | Performed by: INTERNAL MEDICINE

## 2024-12-04 PROCEDURE — 80053 COMPREHEN METABOLIC PANEL: CPT | Performed by: INTERNAL MEDICINE

## 2024-12-04 PROCEDURE — 82043 UR ALBUMIN QUANTITATIVE: CPT | Performed by: INTERNAL MEDICINE

## 2024-12-04 PROCEDURE — 83036 HEMOGLOBIN GLYCOSYLATED A1C: CPT | Performed by: INTERNAL MEDICINE

## 2024-12-04 PROCEDURE — 80061 LIPID PANEL: CPT | Performed by: INTERNAL MEDICINE

## 2024-12-04 PROCEDURE — G0439 PPPS, SUBSEQ VISIT: HCPCS | Performed by: INTERNAL MEDICINE

## 2024-12-04 PROCEDURE — 82570 ASSAY OF URINE CREATININE: CPT | Performed by: INTERNAL MEDICINE

## 2024-12-04 PROCEDURE — 99213 OFFICE O/P EST LOW 20 MIN: CPT | Mod: 25 | Performed by: INTERNAL MEDICINE

## 2024-12-04 RX ORDER — LANCETS
EACH MISCELLANEOUS
Qty: 100 EACH | Refills: 6 | Status: SHIPPED | OUTPATIENT
Start: 2024-12-04

## 2024-12-04 NOTE — PATIENT INSTRUCTIONS
Goal blood pressure less than 135/85.  Check your blood pressure regularly at home and MyChart me the results within the next month.  If your blood pressure is running too high, I will have you increase lisinopril and see me in a couple weeks after that.    Get back to regular exercise.  Regular exercise will help your blood pressure.  I would recommend 20 minutes of activity time in your home such as walking around her home and moving your arms and legs.  Or you can do the treadmill for 20 minutes.    You can get a new glucometer with test strips and start checking blood sugars occasionally before breakfast.  You do not have to check blood sugars every day.  If your blood sugars are running above 150 more than occasionally, you should contact me.    See ophthalmology in March for yearly eye exam.    Repeat your bone density test in 2 years.    Schedule your mammogram after December 15.    Your colonoscopy will be due September 2029.    Increased walking and stretching should help your hip and thigh tightness.  But you can see physical therapy for some specific exercises if needed.    To help with irritable bowel and loose stools I would recommend a daily powdered fiber supplement such as psyllium or sugar-free Metamucil on a daily basis.    You do have carpal tunnel symptoms and I would recommend you wear the carpal tunnel wrist braces at night.  If that worsens and becomes constant by more than a day, then consider referral to the hand surgeon.    See me in approximately 4 months for a nonfasting blood pressure and diabetes checkup appointment.

## 2024-12-04 NOTE — PROGRESS NOTES
Preventive Care Visit  [facility]  Olvin Nguyen MD, Internal Medicine  Dec 4, 2024        Helen Chavez   69 year old female    Date of Visit: 12/4/2024    Chief Complaint   Patient presents with    Medicare Visit     fasting     Subjective  69-year-old female lives independently at home with  who has advanced dementia.    She is getting some help from her daughter and neighbors.  She denies depression or feeling overwhelmed, but she has been less active and has been gaining weight, 11 pounds.    She has not checked her blood sugars but has had well-controlled diabetes in the past with hemoglobin A1c level of 6.4% in April.  Has irritable bowel does not tolerate higher doses of metformin, currently at 500 mg a day.  She is tolerating Ozempic.  No emesis or abdominal pain.    Occasional loose stools when she has a salad or certain foods.  She has not been taking her Metamucil fiber.    He just had a colonoscopy September 2024 that showed a one 5 mm polyp and a 5-year follow-up recommendation.    She has not checked her blood pressure recently but has checked it at least once earlier this fall and was 131/75 at home.  In April in clinic it was 120/68 but she was 11 pounds lighter than.    Urine microalbumin level was 101 October 2023    No palpitations or history of arrhythmia.  No lower extremity edema or increasing shortness of breath.  No chest pain or chest pressure.    She has a strong family history of coronary disease with father.  She quit smoking in 1982.  No epigastric pain or bleeding on aspirin daily.  No generalized myalgia complaints on rosuvastatin 10 mg.  Excellent cholesterol levels last year.    She is mildly overweight but does not have daytime sleepiness or history of sleep apnea.    Previous fatty liver but does not drink well.  Negative hepatitis C in 2022.    She is never smoked.  No cough or shortness of breath.    May 2024 DEXA scan with a femur score  of -2.1/-2.2.  Spine score -0.6.  No falls.  No fracture history.    December 2024 mammogram was negative and she denies any new breast changes or lumps.    GYN organs in.  Previous HPV negative.  No vaginal bleeding or spotting.    She has has some intermittent left carpal tunnel symptoms.  She has not been wearing carpal tunnel wrist brace recently, she has had them in the past.  She has had some chronic tendinitis of her thumb as well.  Ganglion cyst is stable there.    She has been complaining of some chronic hip and thigh tightness.  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  She also needs to hang things up on hangers which exacerbates her thumb tendinitis.  No severe diffuse myalgias.    No headache complaints.  No vision changes.  Saw ophthalmology in March of this year and negative for retinopathy.    No foot sores or numbness    PMHx:    Past Medical History:   Diagnosis Date    Diabetes mellitus (H)     Elevated LFTs     History of anesthesia complications     wild after surgery age 15    Hyperlipidemia     Hypertension     Menopause     Microalbuminuria     Warts of foot     right     PSHx:    Past Surgical History:   Procedure Laterality Date    ARTHROSCOPY KNEE PROCEDURE CARTILAGE (GENZYME) Left     BIOPSY BREAST Right 1970    benign lump removed    EXCISE GANGLION WRIST      PILONIDAL CYST / SINUS EXCISION       Immunizations:   Immunization History   Administered Date(s) Administered    COVID-19 MONOVALENT 12+ (Pfizer) 11/12/2021    COVID-19 Monovalent 18+ (Moderna) 02/06/2021, 03/06/2021    Influenza (IIV3) PF 12/11/2003    Influenza Vaccine 65+ (FLUAD) 10/12/2020, 10/14/2021    Pneumococcal 20 valent Conjugate (Prevnar 20) 04/19/2024    TDAP (Adacel,Boostrix) 08/20/2007       ROS A comprehensive review of systems was performed and was otherwise negative    Medications, allergies, and problem list were reviewed and updated    Exam  BP (!) 149/95   Pulse 64   Temp 98.3  F (36.8  C)   Resp 16   Ht  "1.676 m (5' 6\")   Wt 89.8 kg (198 lb)   LMP  (LMP Unknown)   SpO2 96%   BMI 31.96 kg/m    Healthy-appearing female.  Mildly overweight.  Mobility is normal.  Cognition normal.  Clock face drawing normal and word recall normal.  Pupils and irises equal and reactive.  Extraocular muscles intact.  No jaundice or conjunctivitis.  External ears and nose exam is normal with minimal cerumen and normal tympanic membranes.  Pharynx is normal.  Teeth in good condition.  No oral lesions.  No cervical or supraclavicular or axillary adenopathy.  No JVD and no carotid bruits.  No thyromegaly or nodularity to inspection and palpation.  Lungs clear to auscultation with good respiratory excursion.  Heart is regular with no murmur rub or gallop.  +1 pedal pulses and feet in good condition.  No preulcerative calluses on feet.  No ankle edema.  Skin is mildly dry but otherwise normal.  No suspicious skin lesions.  2 seborrheic keratosis noted on the back.  She declined breast exam.  Abdomen is mildly overweight but nontender.  No hepatosplenomegaly and no pulsatile abdominal mass.    Assessment/Plan  1. Annual wellness visit (Primary)  Main focus for patient is cardiovascular risk with her hypertension and diabetes and family history of coronary disease.    Unclear if her blood pressure is controlled as she has had well-controlled blood pressure in the past but is high today.  She did take her lisinopril.  She will check blood pressure closely over the next month and contact me within the next month by Keli.    She declines vaccines.    See patient instructions for health maintenance plan.    2. Essential hypertension  She denies any NSAID use.    If blood pressure is running above 135/85 I would have her increase the lisinopril to 10 mg a day and see me 2 weeks after that.      3. Type 2 diabetes mellitus with other specified complication, without long-term current use of insulin (H)  Has been controlled.  Increase regular " exercise.  Work on weight loss.  Does not tolerate higher doses of metformin with irritable bowel.  Recommended adding sugar-free Metamucil to help with irritable bowel.    Continue Ozempic 1 mg weekly.  Could consider further increase in Ozempic as she could benefit from further weight loss as well.    No foot neuropathy.    Yearly eye exam in March was negative.      - HEMOGLOBIN A1C  - Albumin Random Urine Quantitative with Creat Ratio  - blood glucose monitoring (NO BRAND SPECIFIED) meter device kit; Use to test blood sugar 1 times daily or as directed. Preferred blood glucose meter OR supplies to accompany: Blood Glucose Monitor Brands: per insurance.  Dispense: 1 kit; Refill: 0  - blood glucose (NO BRAND SPECIFIED) test strip; Use to test blood sugar 1 times daily or as directed. To accompany: Blood Glucose Monitor Brands: per insurance.  Dispense: 100 strip; Refill: 6  - thin (NO BRAND SPECIFIED) lancets; Use with lanceting device. To accompany: Blood Glucose Monitor Brands: per insurance.  Dispense: 100 each; Refill: 6    4. Hypercholesterolemia  Goal LDL less than 100.  Has been well-controlled on current rosuvastatin.  - Lipid panel reflex to direct LDL Non-fasting    5. History of colonic polyps  5-year colonoscopy due September 2029.    6. Osteopenia of both hips  Repeat DEXA scan in 2 years, May 2026 or after.  Continue daily walking.    7. Visit for screening mammogram  Yearly mammogram due later this month  - MA Screen Bilateral w/Ricky; Future    8. Encounter for therapeutic drug monitoring    - CBC with platelets  - Comprehensive metabolic panel    9. Hip tightness  With some hip tightness likely from chronic standing with her work and not getting regular exercise or stretching and not doing the treadmill as much recently.  I recommended she increase stretching and walking but if she wants help with her physical therapy, referral placed.  - Physical Therapy  Referral; Future    See patient  instructions regarding her intermittent carpal tunnel symptoms      Return in about 4 months (around 4/4/2025).   Patient Instructions   Goal blood pressure less than 135/85.  Check your blood pressure regularly at home and MyChart me the results within the next month.  If your blood pressure is running too high, I will have you increase lisinopril and see me in a couple weeks after that.    Get back to regular exercise.  Regular exercise will help your blood pressure.  I would recommend 20 minutes of activity time in your home such as walking around her home and moving your arms and legs.  Or you can do the treadmill for 20 minutes.    You can get a new glucometer with test strips and start checking blood sugars occasionally before breakfast.  You do not have to check blood sugars every day.  If your blood sugars are running above 150 more than occasionally, you should contact me.    See ophthalmology in March for yearly eye exam.    Repeat your bone density test in 2 years.    Schedule your mammogram after December 15.    Your colonoscopy will be due September 2029.    Increased walking and stretching should help your hip and thigh tightness.  But you can see physical therapy for some specific exercises if needed.    To help with irritable bowel and loose stools I would recommend a daily powdered fiber supplement such as psyllium or sugar-free Metamucil on a daily basis.    You do have carpal tunnel symptoms and I would recommend you wear the carpal tunnel wrist braces at night.  If that worsens and becomes constant by more than a day, then consider referral to the hand surgeon.    See me in approximately 4 months for a nonfasting blood pressure and diabetes checkup appointment.    Olvin Nguyen MD, MD        Current Outpatient Medications   Medication Sig Dispense Refill    aspirin 81 MG EC tablet [ASPIRIN 81 MG EC TABLET] Take 1 tablet (81 mg total) by mouth daily.  0    BD MELINA 2ND GEN PEN NEEDLE 32 gauge x  "\" Ndle [BD MELINA 2ND GEN PEN NEEDLE 32 GAUGE X \" NDLE] USE ONCE WEEKLY WITH OZEMPIC. 10 each 6    blood glucose (ACCU-CHEK SMARTVIEW) test strip 1 strip by In Vitro route daily 90 strip 2    blood glucose (NO BRAND SPECIFIED) test strip Use to test blood sugar 1 times daily or as directed. To accompany: Blood Glucose Monitor Brands: per insurance. 100 strip 6    blood glucose monitoring (NO BRAND SPECIFIED) meter device kit Use to test blood sugar 1 times daily or as directed. Preferred blood glucose meter OR supplies to accompany: Blood Glucose Monitor Brands: per insurance. 1 kit 0    Bullseye Mini Safety Lancets MISC 1 each 2 times daily 100 each 0    lisinopril (ZESTRIL) 5 MG tablet Take 1 tablet (5 mg) by mouth daily. 100 tablet 2    LOTEMAX 0.5 % OINT opthalmic ointment Place 1 Application into both eyes at bedtime      metFORMIN (GLUCOPHAGE XR) 500 MG 24 hr tablet TAKE ONE TABLET BY MOUTH EVERY DAY 90 tablet 2    rosuvastatin (CRESTOR) 10 MG tablet TAKE ONE TABLET BY MOUTH AT BEDTIME 100 tablet 2    Semaglutide, 1 MG/DOSE, (OZEMPIC) 4 MG/3ML pen Inject 1 mg Subcutaneous every 7 days 9 mL 4    thin (NO BRAND SPECIFIED) lancets Use with lanceting device. To accompany: Blood Glucose Monitor Brands: per insurance. 100 each 6    vitamin (B COMPLEX-C) tablet Take 1 tablet by mouth daily      Vitamin D3 (CHOLECALCIFEROL) 125 MCG (5000 UT) tablet Take 2 tablets by mouth daily       Allergies   Allergen Reactions    Jardiance [Empagliflozin] Other (See Comments)     Yeast infection, constipation    Losartan Muscle Pain (Myalgia)     Muscle cramps    Penicillins Hives     Social History     Tobacco Use    Smoking status: Former     Current packs/day: 0.00     Types: Cigarettes     Start date: 1972     Quit date: 1982     Years since quittin.9     Passive exposure: Past    Smokeless tobacco: Never   Vaping Use    Vaping status: Never Used   Substance Use Topics    Alcohol use: Not Currently    Drug " use: No             Subjective   Zoila is a 69 year old, presenting for the following:  Medicare Visit (fasting)        12/4/2024     8:24 AM   Additional Questions   Roomed by Isabel   Accompanied by luciano CORDON          Health Care Directive  Patient does not have a Health Care Directive: Patient states has Advance Directive and will bring in a copy to clinic.      12/1/2024   General Health   How would you rate your overall physical health? Good   Feel stress (tense, anxious, or unable to sleep) To some extent      (!) STRESS CONCERN      12/1/2024   Nutrition   Diet: Diabetic            12/1/2024   Exercise   Days per week of moderate/strenous exercise 3 days   Average minutes spent exercising at this level Patient declined            12/1/2024   Social Factors   Frequency of gathering with friends or relatives Three times a week   Worry food won't last until get money to buy more No   Food not last or not have enough money for food? No   Do you have housing? (Housing is defined as stable permanent housing and does not include staying ouside in a car, in a tent, in an abandoned building, in an overnight shelter, or couch-surfing.) Yes   Are you worried about losing your housing? No   Lack of transportation? No   Unable to get utilities (heat,electricity)? No            12/1/2024   Fall Risk   Fallen 2 or more times in the past year? No     No    Trouble with walking or balance? No     Yes        Patient-reported    Multiple values from one day are sorted in reverse-chronological order          12/1/2024   Activities of Daily Living- Home Safety   Needs help with the following daily activites None of the above   Safety concerns in the home None of the above            12/1/2024   Dental   Dentist two times every year? Yes            12/1/2024   Hearing Screening   Hearing concerns? (!) I NEED TO ASK PEOPLE TO SPEAK UP OR REPEAT THEMSELVES.    (!) IT'S HARD TO FOLLOW A CONVERSATION IN A NOISY RESTAURANT OR  CROWDED ROOM.       Multiple values from one day are sorted in reverse-chronological order         2024   Driving Risk Screening   Patient/family members have concerns about driving No            2024   General Alertness/Fatigue Screening   Have you been more tired than usual lately? No            2024   Urinary Incontinence Screening   Bothered by leaking urine in past 6 months No            2024   TB Screening   Were you born outside of the [place]? No            Today's PHQ-2 Score:       2024     8:15 AM   PHQ-2 (  Pfizer)   Q1: Little interest or pleasure in doing things 0    Q2: Feeling down, depressed or hopeless 0    PHQ-2 Score 0    Q1: Little interest or pleasure in doing things Not at all   Q2: Feeling down, depressed or hopeless Not at all   PHQ-2 Score 0       Patient-reported           2024   Substance Use   Alcohol more than 3/day or more than 7/wk No   Do you have a current opioid prescription? No   How severe/bad is pain from 1 to 10? 6/10   Do you use any other substances recreationally? No        Social History     Tobacco Use    Smoking status: Former     Current packs/day: 0.00     Types: Cigarettes     Start date: 1972     Quit date: 1982     Years since quittin.9     Passive exposure: Past    Smokeless tobacco: Never   Vaping Use    Vaping status: Never Used   Substance Use Topics    Alcohol use: Not Currently    Drug use: No           12/15/2023   LAST FHS-7 RESULTS   1st degree relative breast or ovarian cancer No   Any relative bilateral breast cancer No   Any male have breast cancer No   Any ONE woman have BOTH breast AND ovarian cancer No   Any woman with breast cancer before 50yrs No   2 or more relatives with breast AND/OR ovarian cancer No   2 or more relatives with breast AND/OR bowel cancer No                 History of abnormal Pap smear:         2017    10:09 AM 2015    10:07 AM   PAP / HPV   PAP Negative for squamous  intraepithelial lesion or malignancy  Electronically signed by Bambi Miguel CT (ASCP) on 8/7/2017 at  2:42 PM    Negative for squamous intraepithelial lesion or malignancy  Electronically signed by Odette Myers CT (ASCP) on 8/6/2015 at 11:23 AM        ASCVD Risk   The 10-year ASCVD risk score (Dylan DK, et al., 2019) is: 27.9%    Values used to calculate the score:      Age: 69 years      Sex: Female      Is Non- : No      Diabetic: Yes      Tobacco smoker: No      Systolic Blood Pressure: 149 mmHg      Is BP treated: Yes      HDL Cholesterol: 54 mg/dL      Total Cholesterol: 152 mg/dL            Reviewed and updated as needed this visit by Provider                      Current providers sharing in care for this patient include:  Patient Care Team:  Olvin Nguyen MD as PCP - General (Internal Medicine)  Velia Cuellar, PharmD as Pharmacist (Pharmacist)  Olvin Nguyen MD as Assigned PCP    The following health maintenance items are reviewed in Epic and correct as of today:  Health Maintenance   Topic Date Due    DIABETIC FOOT EXAM  Never done    RSV VACCINE (1 - Risk 60-74 years 1-dose series) Never done    ZOSTER IMMUNIZATION (2 of 2) 11/14/2016    MEDICARE ANNUAL WELLNESS VISIT  04/29/2023    ANNUAL REVIEW OF HM ORDERS  04/29/2023    INFLUENZA VACCINE (1) 09/01/2024    COVID-19 Vaccine (4 - 2024-25 season) 09/01/2024    A1C  10/19/2024    LIPID  10/23/2024    MICROALBUMIN  10/23/2024    EYE EXAM  03/18/2025    BMP  04/19/2025    FALL RISK ASSESSMENT  12/04/2025    MAMMO SCREENING  12/15/2025    DTAP/TDAP/TD IMMUNIZATION (3 - Td or Tdap) 09/19/2026    ADVANCE CARE PLANNING  04/29/2027    COLORECTAL CANCER SCREENING  09/09/2029    DEXA  05/22/2039    HEPATITIS C SCREENING  Completed    PHQ-2 (once per calendar year)  Completed    Pneumococcal Vaccine: 65+ Years  Completed    HPV IMMUNIZATION  Aged Out    MENINGITIS IMMUNIZATION  Aged Out    RSV MONOCLONAL ANTIBODY   "Aged Out            Objective    Exam  BP (!) 149/95   Pulse 64   Temp 98.3  F (36.8  C)   Resp 16   Ht 1.676 m (5' 6\")   Wt 89.8 kg (198 lb)   LMP  (LMP Unknown)   SpO2 96%   BMI 31.96 kg/m     Estimated body mass index is 31.96 kg/m  as calculated from the following:    Height as of this encounter: 1.676 m (5' 6\").    Weight as of this encounter: 89.8 kg (198 lb).    Physical Exam           12/4/2024   Mini Cog   Clock Draw Score 2 Normal   3 Item Recall 2 objects recalled   Mini Cog Total Score 4                 Signed Electronically by: Olvin Nguyen MD    " 97

## 2024-12-05 ENCOUNTER — PATIENT OUTREACH (OUTPATIENT)
Dept: CARE COORDINATION | Facility: CLINIC | Age: 69
End: 2024-12-05
Payer: COMMERCIAL

## 2025-01-03 DIAGNOSIS — E11.69 TYPE 2 DIABETES MELLITUS WITH OTHER SPECIFIED COMPLICATION, WITHOUT LONG-TERM CURRENT USE OF INSULIN (H): ICD-10-CM

## 2025-01-03 NOTE — TELEPHONE ENCOUNTER
Incoming call from the patient. She states that this must be a mistake. She has already received the kit and she is not needing any supplies at this time. Routing back to refill team to decline the kit.

## 2025-01-06 RX ORDER — BLOOD-GLUCOSE METER
EACH MISCELLANEOUS
Qty: 1 KIT | Refills: 0 | Status: SHIPPED | OUTPATIENT
Start: 2025-01-06

## 2025-01-08 ENCOUNTER — HOSPITAL ENCOUNTER (OUTPATIENT)
Dept: MAMMOGRAPHY | Facility: CLINIC | Age: 70
Discharge: HOME | End: 2025-01-08
Attending: INTERNAL MEDICINE
Payer: COMMERCIAL

## 2025-01-08 DIAGNOSIS — Z12.31 VISIT FOR SCREENING MAMMOGRAM: ICD-10-CM

## 2025-01-08 PROCEDURE — 77063 BREAST TOMOSYNTHESIS BI: CPT

## 2025-02-13 ENCOUNTER — TRANSFERRED RECORDS (OUTPATIENT)
Dept: HEALTH INFORMATION MANAGEMENT | Facility: CLINIC | Age: 70
End: 2025-02-13
Payer: COMMERCIAL

## 2025-03-31 ENCOUNTER — TRANSFERRED RECORDS (OUTPATIENT)
Dept: HEALTH INFORMATION MANAGEMENT | Facility: CLINIC | Age: 70
End: 2025-03-31
Payer: COMMERCIAL

## 2025-03-31 LAB — RETINOPATHY: NEGATIVE

## 2025-04-26 DIAGNOSIS — E11.9 TYPE 2 DIABETES MELLITUS WITHOUT COMPLICATION, WITHOUT LONG-TERM CURRENT USE OF INSULIN (H): ICD-10-CM

## 2025-04-28 RX ORDER — SEMAGLUTIDE 1.34 MG/ML
INJECTION, SOLUTION SUBCUTANEOUS
Qty: 9 ML | Refills: 4 | Status: SHIPPED | OUTPATIENT
Start: 2025-04-28

## 2025-06-03 ENCOUNTER — TRANSFERRED RECORDS (OUTPATIENT)
Dept: HEALTH INFORMATION MANAGEMENT | Facility: CLINIC | Age: 70
End: 2025-06-03
Payer: COMMERCIAL

## 2025-06-07 ENCOUNTER — HEALTH MAINTENANCE LETTER (OUTPATIENT)
Age: 70
End: 2025-06-07

## 2025-08-06 DIAGNOSIS — E78.00 HYPERCHOLESTEROLEMIA: ICD-10-CM

## 2025-08-06 DIAGNOSIS — I10 ESSENTIAL HYPERTENSION: ICD-10-CM

## 2025-08-07 RX ORDER — ROSUVASTATIN CALCIUM 10 MG/1
10 TABLET, COATED ORAL
Qty: 100 TABLET | Refills: 2 | Status: SHIPPED | OUTPATIENT
Start: 2025-08-07

## 2025-08-07 RX ORDER — LISINOPRIL 5 MG/1
5 TABLET ORAL DAILY
Qty: 100 TABLET | Refills: 2 | Status: SHIPPED | OUTPATIENT
Start: 2025-08-07